# Patient Record
Sex: MALE | Race: WHITE | NOT HISPANIC OR LATINO | URBAN - METROPOLITAN AREA
[De-identification: names, ages, dates, MRNs, and addresses within clinical notes are randomized per-mention and may not be internally consistent; named-entity substitution may affect disease eponyms.]

---

## 2017-03-21 ENCOUNTER — OUTPATIENT (OUTPATIENT)
Dept: OUTPATIENT SERVICES | Facility: HOSPITAL | Age: 52
LOS: 1 days | Discharge: HOME | End: 2017-03-21

## 2017-06-27 DIAGNOSIS — Z00.00 ENCOUNTER FOR GENERAL ADULT MEDICAL EXAMINATION WITHOUT ABNORMAL FINDINGS: ICD-10-CM

## 2017-06-27 DIAGNOSIS — I48.0 PAROXYSMAL ATRIAL FIBRILLATION: ICD-10-CM

## 2017-06-27 DIAGNOSIS — I10 ESSENTIAL (PRIMARY) HYPERTENSION: ICD-10-CM

## 2017-06-27 DIAGNOSIS — E11.9 TYPE 2 DIABETES MELLITUS WITHOUT COMPLICATIONS: ICD-10-CM

## 2017-09-01 ENCOUNTER — OUTPATIENT (OUTPATIENT)
Dept: OUTPATIENT SERVICES | Facility: HOSPITAL | Age: 52
LOS: 1 days | Discharge: HOME | End: 2017-09-01

## 2017-09-01 DIAGNOSIS — Z01.818 ENCOUNTER FOR OTHER PREPROCEDURAL EXAMINATION: ICD-10-CM

## 2017-09-01 DIAGNOSIS — I48.91 UNSPECIFIED ATRIAL FIBRILLATION: ICD-10-CM

## 2017-09-01 DIAGNOSIS — R07.9 CHEST PAIN, UNSPECIFIED: ICD-10-CM

## 2017-09-16 ENCOUNTER — OUTPATIENT (OUTPATIENT)
Dept: OUTPATIENT SERVICES | Facility: HOSPITAL | Age: 52
LOS: 1 days | Discharge: HOME | End: 2017-09-16

## 2017-09-16 DIAGNOSIS — I48.91 UNSPECIFIED ATRIAL FIBRILLATION: ICD-10-CM

## 2017-09-16 DIAGNOSIS — R07.9 CHEST PAIN, UNSPECIFIED: ICD-10-CM

## 2017-10-20 ENCOUNTER — OUTPATIENT (OUTPATIENT)
Dept: OUTPATIENT SERVICES | Facility: HOSPITAL | Age: 52
LOS: 1 days | Discharge: HOME | End: 2017-10-20

## 2017-10-20 DIAGNOSIS — I48.91 UNSPECIFIED ATRIAL FIBRILLATION: ICD-10-CM

## 2017-10-20 DIAGNOSIS — M25.559 PAIN IN UNSPECIFIED HIP: ICD-10-CM

## 2017-10-20 DIAGNOSIS — E66.9 OBESITY, UNSPECIFIED: ICD-10-CM

## 2017-10-20 DIAGNOSIS — R07.9 CHEST PAIN, UNSPECIFIED: ICD-10-CM

## 2017-10-20 DIAGNOSIS — I10 ESSENTIAL (PRIMARY) HYPERTENSION: ICD-10-CM

## 2017-10-20 DIAGNOSIS — I48.0 PAROXYSMAL ATRIAL FIBRILLATION: ICD-10-CM

## 2017-10-20 DIAGNOSIS — E11.9 TYPE 2 DIABETES MELLITUS WITHOUT COMPLICATIONS: ICD-10-CM

## 2017-11-13 ENCOUNTER — OUTPATIENT (OUTPATIENT)
Facility: HOSPITAL | Age: 52
LOS: 1 days | Discharge: HOME | End: 2017-11-13

## 2017-11-14 DIAGNOSIS — I48.91 UNSPECIFIED ATRIAL FIBRILLATION: ICD-10-CM

## 2018-01-30 DIAGNOSIS — I48.91 UNSPECIFIED ATRIAL FIBRILLATION: ICD-10-CM

## 2018-01-30 DIAGNOSIS — R07.9 CHEST PAIN, UNSPECIFIED: ICD-10-CM

## 2018-11-19 ENCOUNTER — OUTPATIENT (OUTPATIENT)
Dept: OUTPATIENT SERVICES | Facility: HOSPITAL | Age: 53
LOS: 1 days | Discharge: HOME | End: 2018-11-19

## 2018-11-20 DIAGNOSIS — E11.9 TYPE 2 DIABETES MELLITUS WITHOUT COMPLICATIONS: ICD-10-CM

## 2018-11-23 ENCOUNTER — OUTPATIENT (OUTPATIENT)
Dept: OUTPATIENT SERVICES | Facility: HOSPITAL | Age: 53
LOS: 1 days | Discharge: HOME | End: 2018-11-23

## 2018-11-23 DIAGNOSIS — M25.559 PAIN IN UNSPECIFIED HIP: ICD-10-CM

## 2019-04-29 ENCOUNTER — OUTPATIENT (OUTPATIENT)
Dept: OUTPATIENT SERVICES | Facility: HOSPITAL | Age: 54
LOS: 1 days | Discharge: HOME | End: 2019-04-29

## 2019-04-29 DIAGNOSIS — I10 ESSENTIAL (PRIMARY) HYPERTENSION: ICD-10-CM

## 2019-04-29 DIAGNOSIS — I48.0 PAROXYSMAL ATRIAL FIBRILLATION: ICD-10-CM

## 2020-01-06 ENCOUNTER — EMERGENCY (EMERGENCY)
Facility: HOSPITAL | Age: 55
LOS: 0 days | Discharge: HOME | End: 2020-01-06
Attending: EMERGENCY MEDICINE | Admitting: EMERGENCY MEDICINE
Payer: COMMERCIAL

## 2020-01-06 VITALS
TEMPERATURE: 97 F | DIASTOLIC BLOOD PRESSURE: 65 MMHG | RESPIRATION RATE: 18 BRPM | OXYGEN SATURATION: 98 % | SYSTOLIC BLOOD PRESSURE: 130 MMHG | HEART RATE: 65 BPM

## 2020-01-06 VITALS — WEIGHT: 259.93 LBS

## 2020-01-06 DIAGNOSIS — I10 ESSENTIAL (PRIMARY) HYPERTENSION: ICD-10-CM

## 2020-01-06 DIAGNOSIS — Z79.899 OTHER LONG TERM (CURRENT) DRUG THERAPY: ICD-10-CM

## 2020-01-06 DIAGNOSIS — M19.90 UNSPECIFIED OSTEOARTHRITIS, UNSPECIFIED SITE: ICD-10-CM

## 2020-01-06 DIAGNOSIS — G89.29 OTHER CHRONIC PAIN: ICD-10-CM

## 2020-01-06 DIAGNOSIS — M25.559 PAIN IN UNSPECIFIED HIP: ICD-10-CM

## 2020-01-06 DIAGNOSIS — E11.9 TYPE 2 DIABETES MELLITUS WITHOUT COMPLICATIONS: ICD-10-CM

## 2020-01-06 DIAGNOSIS — M25.552 PAIN IN LEFT HIP: ICD-10-CM

## 2020-01-06 DIAGNOSIS — Z87.891 PERSONAL HISTORY OF NICOTINE DEPENDENCE: ICD-10-CM

## 2020-01-06 PROCEDURE — 73502 X-RAY EXAM HIP UNI 2-3 VIEWS: CPT | Mod: 26,LT

## 2020-01-06 PROCEDURE — 99283 EMERGENCY DEPT VISIT LOW MDM: CPT

## 2020-01-06 RX ORDER — OXYCODONE AND ACETAMINOPHEN 5; 325 MG/1; MG/1
2 TABLET ORAL ONCE
Refills: 0 | Status: DISCONTINUED | OUTPATIENT
Start: 2020-01-06 | End: 2020-01-06

## 2020-01-06 RX ORDER — METHOCARBAMOL 500 MG/1
2 TABLET, FILM COATED ORAL
Qty: 40 | Refills: 0
Start: 2020-01-06 | End: 2020-01-10

## 2020-01-06 RX ADMIN — OXYCODONE AND ACETAMINOPHEN 2 TABLET(S): 5; 325 TABLET ORAL at 16:21

## 2020-01-06 NOTE — ED PROVIDER NOTE - NS ED ROS FT
Constitutional: No fevers.   MS:  +left hip pain.   Neuro:  No numbness/tingling of extremities.   Skin:  No skin rash.   Endocrine: No hx of DM.

## 2020-01-06 NOTE — ED ADULT NURSE NOTE - NSIMPLEMENTINTERV_GEN_ALL_ED
Implemented All Fall Risk Interventions:  [place] to call system. Call bell, personal items and telephone within reach. Instruct patient to call for assistance. Room bathroom lighting operational. Non-slip footwear when patient is off stretcher. Physically safe environment: no spills, clutter or unnecessary equipment. Stretcher in lowest position, wheels locked, appropriate side rails in place. Provide visual cue, wrist band, yellow gown, etc. Monitor gait and stability. Monitor for mental status changes and reorient to person, place, and time. Review medications for side effects contributing to fall risk. Reinforce activity limits and safety measures with patient and family.

## 2020-01-06 NOTE — ED PROVIDER NOTE - ATTENDING CONTRIBUTION TO CARE
53 yo m with pmh of severe bilateral hip arthritis.  pt is supposed to get bilateral hip replacements done by ortho.  The left hip is more urgent to be replaced as per pt, but surgery was delayed due to surgeon requesting pt to lose weight.  pt reports 5 days of worsening left hip pain after pt was in the bathroom and felt left hip giving out.  no fall.  no head injury.  no numbness.  no injury.  pt walks with cane at baseline.  pt has been able to ambulate and bear weight.    awake, alert.  no midline vertebral tenderness.  LLE:  motor/sensation intact, flexion intact in hip.  no bony tenderness.    xray unremarkable for anything acute.  p: pain control, dc with outpatient ortho follow up

## 2020-01-06 NOTE — ED PROVIDER NOTE - OBJECTIVE STATEMENT
Patient is a 55 yo M w/ hx of a-fib on xarelto, DM, HTN, arthritis p/w hip pain. Patient in process of getting bilateral hip replacement with Dr. Jamaal Almaraz; follow up appointment on Jan 14. Patient's left hip gave out on him  (never fell) on Jan 2nd; since then has been ambulating but has increased pain to the left hip radiating down the left leg. Patient denies numbness/tingling of the lower extremities. Has been taking robaxin and alleve without relief. Patient considered he may have a fracture. Denies urinary/bowel incontinence. Denies fevers, denies drug use.

## 2020-01-06 NOTE — ED PROVIDER NOTE - PHYSICAL EXAMINATION
CONSTITUTIONAL: Well-developed; obese; in no acute distress.   SKIN: warm, dry  HEAD: Normocephalic; atraumatic.  EXT: Ambulating with moderate discomfort; limited range of motion of bilateral hips. Tenderness to palpation in the left gluteal region. Negative straight leg raise.   NEURO: Alert, oriented, sensation to light touch intact to both lower extremities.   VASCULAR: DP pulses 2+ bilaterally.   PSYCH: Cooperative, appropriate.

## 2020-01-06 NOTE — ED PROVIDER NOTE - CARE PROVIDER_API CALL
Rinku Boothe)  Orthopaedic Surgery  [address] [place] [id number]  Phone: [phone]  Fax: [phone]  Follow Up Time: 1-3 Days

## 2020-01-06 NOTE — ED PROVIDER NOTE - CLINICAL SUMMARY MEDICAL DECISION MAKING FREE TEXT BOX
Pt with history of chronic bilateral hip arthritis and he is followed by ortho and requires bilateral hip replacement.  pt here with worsneing left hip pain.  no numbness, no trauma, no fall.  no direct injury.  pt able to ambulate and is able to bear weight.    xray no acute fracture.  pt already has follow up scheduled with his orthopedic.  pt aware of importance of ortho follow up for further management.

## 2020-01-06 NOTE — ED PROVIDER NOTE - NSFOLLOWUPINSTRUCTIONS_ED_ALL_ED_FT
Arthralgia    WHAT YOU NEED TO KNOW:    Arthralgia is pain in one or more joints, with no inflammation. It may be short-term and get better within 6 to 8 weeks. Arthralgia can be an early sign of arthritis. Arthralgia may be caused by a medical condition, such as a hormone disorder or a tumor. It may also be caused by an infection or injury.     DISCHARGE INSTRUCTIONS:    Medicines: The following medicines may be ordered for you:     Acetaminophen decreases pain. Ask how much to take and how often to take it. Follow directions. Acetaminophen can cause liver damage if not taken correctly.      NSAIDs decrease pain and prevent swelling. Ask your healthcare provider which medicine is right for you. Ask how much to take and when to take it. Take as directed. NSAIDs can cause stomach bleeding and kidney problems if not taken correctly.       Pain relief cream decreases pain. Use this cream as directed.      Take your medicine as directed. Contact your healthcare provider if you think your medicine is not helping or if you have side effects. Tell him of her if you are allergic to any medicine. Keep a list of the medicines, vitamins, and herbs you take. Include the amounts, and when and why you take them. Bring the list or the pill bottles to follow-up visits. Carry your medicine list with you in case of an emergency.    Follow up with your healthcare provider or specialist as directed: Write down your questions so you remember to ask them during your visits.     Self-care:     Apply heat to help decrease pain. Use a heating pad or heat wrap. Apply heat for 20 to 30 minutes every 2 hours for as many days as directed.       Rest as much as possible. Avoid activities that cause joint pain.       Apply ice to help decrease swelling and pain. Ice may also help prevent tissue damage. Use an ice pack, or put crushed ice in a plastic bag. Cover it with a towel and place it on your painful joint for 15 to 20 minutes every hour or as directed.       Support the joint with a brace or elastic wrap as directed.       Elevate your joint above the level of your heart as often as you can to help decrease swelling and pain. Prop your painful joint on pillows or blankets to keep it elevated comfortably.       Lose weight if you are overweight. Extra weight can put pressure on your joints and cause more pain. Ask your healthcare provider how much you should weigh. Ask him to help you create a weight loss plan.       Exercise regularly to help improve joint movement and to decrease pain. Ask about the best exercise plan for you. Low-impact exercises can help take the pressure off your joints. Examples are walking, swimming, and water aerobics.     Physical therapy: A physical therapist teaches you exercises to help improve movement and strength, and to decrease pain. Ask your healthcare provider if physical therapy is right for you.    Contact your healthcare provider or specialist if:     You have a fever.       You continue to have joint pain that cannot be relieved with heat, ice, or medicine.      You have pain and inflammation around your joint.      You have questions or concerns about your condition or care.    Return to the emergency department if:     You have sudden, severe pain when you move your joint.       You have a fever and shaking chills.      You cannot move your joint.      You lose feeling on the side of your body where you have the painful joint.          © Copyright [employer] 2019 All illustrations and images included in CareNotes are the copyrighted property of A.D.A.M., Inc. or [employer].

## 2020-09-29 PROBLEM — I10 ESSENTIAL (PRIMARY) HYPERTENSION: Chronic | Status: ACTIVE | Noted: 2020-01-06

## 2020-09-29 PROBLEM — I48.91 UNSPECIFIED ATRIAL FIBRILLATION: Chronic | Status: ACTIVE | Noted: 2020-01-06

## 2020-09-29 PROBLEM — E11.9 TYPE 2 DIABETES MELLITUS WITHOUT COMPLICATIONS: Chronic | Status: ACTIVE | Noted: 2020-01-06

## 2020-10-06 ENCOUNTER — OUTPATIENT (OUTPATIENT)
Dept: OUTPATIENT SERVICES | Facility: HOSPITAL | Age: 55
LOS: 1 days | Discharge: HOME | End: 2020-10-06
Payer: COMMERCIAL

## 2020-10-06 VITALS
OXYGEN SATURATION: 98 % | SYSTOLIC BLOOD PRESSURE: 143 MMHG | RESPIRATION RATE: 15 BRPM | DIASTOLIC BLOOD PRESSURE: 78 MMHG | WEIGHT: 250.22 LBS | TEMPERATURE: 98 F | HEART RATE: 78 BPM | HEIGHT: 69 IN

## 2020-10-06 DIAGNOSIS — Z98.890 OTHER SPECIFIED POSTPROCEDURAL STATES: Chronic | ICD-10-CM

## 2020-10-06 DIAGNOSIS — M16.12 UNILATERAL PRIMARY OSTEOARTHRITIS, LEFT HIP: ICD-10-CM

## 2020-10-06 DIAGNOSIS — Z01.818 ENCOUNTER FOR OTHER PREPROCEDURAL EXAMINATION: ICD-10-CM

## 2020-10-06 LAB
A1C WITH ESTIMATED AVERAGE GLUCOSE RESULT: 7.2 % — HIGH (ref 4–5.6)
ALBUMIN SERPL ELPH-MCNC: 4.8 G/DL — SIGNIFICANT CHANGE UP (ref 3.5–5.2)
ALP SERPL-CCNC: 52 U/L — SIGNIFICANT CHANGE UP (ref 30–115)
ALT FLD-CCNC: 17 U/L — SIGNIFICANT CHANGE UP (ref 0–41)
ANION GAP SERPL CALC-SCNC: 10 MMOL/L — SIGNIFICANT CHANGE UP (ref 7–14)
APTT BLD: 31.2 SEC — SIGNIFICANT CHANGE UP (ref 27–39.2)
AST SERPL-CCNC: 19 U/L — SIGNIFICANT CHANGE UP (ref 0–41)
BASOPHILS # BLD AUTO: 0.04 K/UL — SIGNIFICANT CHANGE UP (ref 0–0.2)
BASOPHILS NFR BLD AUTO: 0.6 % — SIGNIFICANT CHANGE UP (ref 0–1)
BILIRUB SERPL-MCNC: 0.5 MG/DL — SIGNIFICANT CHANGE UP (ref 0.2–1.2)
BLD GP AB SCN SERPL QL: SIGNIFICANT CHANGE UP
BUN SERPL-MCNC: 18 MG/DL — SIGNIFICANT CHANGE UP (ref 10–20)
CALCIUM SERPL-MCNC: 9.9 MG/DL — SIGNIFICANT CHANGE UP (ref 8.5–10.1)
CHLORIDE SERPL-SCNC: 106 MMOL/L — SIGNIFICANT CHANGE UP (ref 98–110)
CO2 SERPL-SCNC: 23 MMOL/L — SIGNIFICANT CHANGE UP (ref 17–32)
CREAT SERPL-MCNC: 0.7 MG/DL — SIGNIFICANT CHANGE UP (ref 0.7–1.5)
EOSINOPHIL # BLD AUTO: 0.25 K/UL — SIGNIFICANT CHANGE UP (ref 0–0.7)
EOSINOPHIL NFR BLD AUTO: 3.9 % — SIGNIFICANT CHANGE UP (ref 0–8)
ESTIMATED AVERAGE GLUCOSE: 160 MG/DL — HIGH (ref 68–114)
GLUCOSE SERPL-MCNC: 153 MG/DL — HIGH (ref 70–99)
HCT VFR BLD CALC: 44.8 % — SIGNIFICANT CHANGE UP (ref 42–52)
HGB BLD-MCNC: 15.4 G/DL — SIGNIFICANT CHANGE UP (ref 14–18)
IMM GRANULOCYTES NFR BLD AUTO: 0.3 % — SIGNIFICANT CHANGE UP (ref 0.1–0.3)
INR BLD: 1.17 RATIO — SIGNIFICANT CHANGE UP (ref 0.65–1.3)
LYMPHOCYTES # BLD AUTO: 2.07 K/UL — SIGNIFICANT CHANGE UP (ref 1.2–3.4)
LYMPHOCYTES # BLD AUTO: 32.5 % — SIGNIFICANT CHANGE UP (ref 20.5–51.1)
MCHC RBC-ENTMCNC: 32.7 PG — HIGH (ref 27–31)
MCHC RBC-ENTMCNC: 34.4 G/DL — SIGNIFICANT CHANGE UP (ref 32–37)
MCV RBC AUTO: 95.1 FL — HIGH (ref 80–94)
MONOCYTES # BLD AUTO: 0.7 K/UL — HIGH (ref 0.1–0.6)
MONOCYTES NFR BLD AUTO: 11 % — HIGH (ref 1.7–9.3)
MRSA PCR RESULT.: NEGATIVE — SIGNIFICANT CHANGE UP
NEUTROPHILS # BLD AUTO: 3.29 K/UL — SIGNIFICANT CHANGE UP (ref 1.4–6.5)
NEUTROPHILS NFR BLD AUTO: 51.7 % — SIGNIFICANT CHANGE UP (ref 42.2–75.2)
NRBC # BLD: 0 /100 WBCS — SIGNIFICANT CHANGE UP (ref 0–0)
PLATELET # BLD AUTO: 208 K/UL — SIGNIFICANT CHANGE UP (ref 130–400)
POTASSIUM SERPL-MCNC: 4.6 MMOL/L — SIGNIFICANT CHANGE UP (ref 3.5–5)
POTASSIUM SERPL-SCNC: 4.6 MMOL/L — SIGNIFICANT CHANGE UP (ref 3.5–5)
PROT SERPL-MCNC: 7.4 G/DL — SIGNIFICANT CHANGE UP (ref 6–8)
PROTHROM AB SERPL-ACNC: 13.4 SEC — HIGH (ref 9.95–12.87)
RBC # BLD: 4.71 M/UL — SIGNIFICANT CHANGE UP (ref 4.7–6.1)
RBC # FLD: 12.1 % — SIGNIFICANT CHANGE UP (ref 11.5–14.5)
SODIUM SERPL-SCNC: 139 MMOL/L — SIGNIFICANT CHANGE UP (ref 135–146)
WBC # BLD: 6.37 K/UL — SIGNIFICANT CHANGE UP (ref 4.8–10.8)
WBC # FLD AUTO: 6.37 K/UL — SIGNIFICANT CHANGE UP (ref 4.8–10.8)

## 2020-10-06 PROCEDURE — 73502 X-RAY EXAM HIP UNI 2-3 VIEWS: CPT | Mod: 26,LT

## 2020-10-06 PROCEDURE — 93010 ELECTROCARDIOGRAM REPORT: CPT

## 2020-10-06 PROCEDURE — 71046 X-RAY EXAM CHEST 2 VIEWS: CPT | Mod: 26

## 2020-10-06 NOTE — H&P PST ADULT - RS GEN PE MLT RESP DETAILS PC
respirations non-labored/airway patent/breath sounds equal/no chest wall tenderness/normal/no intercostal retractions/good air movement/clear to auscultation bilaterally

## 2020-10-06 NOTE — H&P PST ADULT - REASON FOR ADMISSION
PT PRESENTS TO PAST WITH NO SOB, CP, PALPITATIONS, DYSURIA, UTI OR URI AT PRESENT.   PT ABLE TO WALK UP 1 -2  FLIGHTS OF STEPS WITH NO SOB-VERY SLOW- AK AMBULATES WITH A CANE.   AS PER THE PT, THIS IS HIS/HER COMPLETE MEDICAL AND SURGICAL HX, INCLUDING MEDICATIONS PRESCRIBED AND OVER THE COUNTER  pt denies any covid s/s, or tested positive in the past  pt advised self quarantine till day of procedure  denies travel outside the [place] in the past 30 days  Anesthesia Alert  NO--Difficult Airway  NO--History of neck surgery or radiation  NO--Limited ROM of neck  NO--History of Malignant hyperthermia  NO--Personal or family history of Pseudocholinesterase deficiency  NO--Prior Anesthesia Complication  NO--Latex Allergy  NO--Loose teeth  NO--History of Rheumatoid Arthritis  NO--ASHLI  NO--Other_____  PT SCHEDULED FOR LEFT TOTAL HIP REPLACEMENT.

## 2020-10-18 ENCOUNTER — OUTPATIENT (OUTPATIENT)
Dept: OUTPATIENT SERVICES | Facility: HOSPITAL | Age: 55
LOS: 1 days | Discharge: HOME | End: 2020-10-18

## 2020-10-18 DIAGNOSIS — Z98.890 OTHER SPECIFIED POSTPROCEDURAL STATES: Chronic | ICD-10-CM

## 2020-10-19 DIAGNOSIS — Z11.59 ENCOUNTER FOR SCREENING FOR OTHER VIRAL DISEASES: ICD-10-CM

## 2020-10-20 NOTE — ASU PATIENT PROFILE, ADULT - DOES PATIENT HAVE ADVANCE DIRECTIVE
Verified Results  CBC WITH AUTOMATED DIFFERENTIAL 09Jun2017 08:44AM CAMERON XAVIER   [Jun 13, 2017 10:04AM CAMERON XAVIER]  labs look good.   Will await stress test and echo as discussed.     Test Name Result Flag Reference   WHITE BLOOD COUNT 7.8 K/mcL  4.2-11.0   RED CELL COUNT 4.55 mil/mcL  4.00-5.20   HEMOGLOBIN 13.5 g/dl  12.0-15.5   HEMATOCRIT 41.7 %  36.0-46.5   MEAN CORPUSCULAR VOLUME 91.6 fL  78.0-100.0   MEAN CORPUSCULAR HEMOGLOBIN 29.7 pg  26.0-34.0   MEAN CORPUSCULAR HGB CONC 32.4 g/dl  32.0-36.5   RDW-CV 12.9 %  11.0-15.0   PLATELET COUNT 216 K/mcL  140-450   DIFF TYPE      AUTOMATED DIFFERENTIAL   QUOC% 59 %     LYM% 28 %     MON% 11 %     EOS% 2 %     BASO% 0 %     QUOC ABS 4.6 K/mcL  1.8-7.7   LYM ABS 2.2 K/mcL  1.0-4.8   MON ABS 0.8 K/mcL  0.3-0.9   EOS ABS 0.2 K/mcL  0.1-0.5   BASO ABS 0.0 K/mcL  0.0-0.3     COMP METABOLIC PNL [id number] 08:44AM CAMERON XAVIER   [Jun 13, 2017 10:04AM CAMERON XAVIER]  labs look good.   Will await stress test and echo as discussed.     Test Name Result Flag Reference   FASTING STATUS 12 hrs     SODIUM 143 mmol/L  135-145   POTASSIUM 4.6 mmol/L  3.4-5.1   CHLORIDE 109 mmol/L H    CARBON DIOXIDE 26 mmol/L  21-32   ANION GAP 13 mmol/L  10-20   GLUCOSE 95 mg/dl  65-99   BUN 10 mg/dl  6-20   CREATININE 0.75 mg/dl  0.51-0.95   GFR EST.AFRICAN AMER >90     eGFR results = or >90 mL/min/1.73m2 = Normal kidney function.   GFR EST.NONAFRI AMER >90     eGFR results = or >90 mL/min/1.73m2 = Normal kidney function.   BUN/CREATININE RATIO 13  7-25   CALCIUM 8.9 mg/dl  8.4-10.2   BILIRUBIN TOTAL 0.5 mg/dl  0.2-1.0   GOT/AST 28 Units/L  <38   GPT/ALT 53 Units/L  <79   ALKALINE PHOSPHATASE 95 Units/L     TOTAL PROTEIN 6.8 g/dl  6.4-8.2   ALBUMIN 3.8 g/dl  3.6-5.1   GLOBULIN (CALCULATED) 3.0 g/dl  2.0-4.0   A/G RATIO 1.3  1.0-2.4     TSH WITH REFLEX [id number] 08:44AM CAMERON XAVIER   [Jun 13, 2017 10:04AM CAMERON XAVIER]  labs look good.   Will await stress test  and echo as discussed.     Test Name Result Flag Reference   TSH 4.398 mcUnits/mL  0.350-5.000   Findings most consistent with euthyroid state, no additional testing suggested. TSH may be normal in patients with thyroid dysfunction and pituitary disease. Clinical correlation recommended.  (Reflex TSH algorithm is not recommended in hospitalized patients. A variety of drugs, as well as serious acute and chronic illnesses may alter thyroid function tests. Commonly implicated drugs include glucocorticoids, dopamine, carbamazepine, iodine, amiodarone, lithium and heparin.)        No

## 2020-10-21 ENCOUNTER — INPATIENT (INPATIENT)
Facility: HOSPITAL | Age: 55
LOS: 0 days | Discharge: HOME HEALTH | End: 2020-10-22
Attending: ORTHOPAEDIC SURGERY | Admitting: ORTHOPAEDIC SURGERY
Payer: COMMERCIAL

## 2020-10-21 VITALS
WEIGHT: 255.07 LBS | TEMPERATURE: 97 F | RESPIRATION RATE: 17 BRPM | DIASTOLIC BLOOD PRESSURE: 57 MMHG | HEIGHT: 69 IN | HEART RATE: 66 BPM | OXYGEN SATURATION: 100 % | SYSTOLIC BLOOD PRESSURE: 121 MMHG

## 2020-10-21 DIAGNOSIS — Z98.890 OTHER SPECIFIED POSTPROCEDURAL STATES: Chronic | ICD-10-CM

## 2020-10-21 DIAGNOSIS — M16.9 OSTEOARTHRITIS OF HIP, UNSPECIFIED: ICD-10-CM

## 2020-10-21 PROCEDURE — 88305 TISSUE EXAM BY PATHOLOGIST: CPT | Mod: 26

## 2020-10-21 PROCEDURE — 99233 SBSQ HOSP IP/OBS HIGH 50: CPT

## 2020-10-21 PROCEDURE — 88311 DECALCIFY TISSUE: CPT | Mod: 26

## 2020-10-21 RX ORDER — SODIUM CHLORIDE 9 MG/ML
1000 INJECTION, SOLUTION INTRAVENOUS
Refills: 0 | Status: DISCONTINUED | OUTPATIENT
Start: 2020-10-21 | End: 2020-10-21

## 2020-10-21 RX ORDER — DEXTROSE 50 % IN WATER 50 %
12.5 SYRINGE (ML) INTRAVENOUS ONCE
Refills: 0 | Status: DISCONTINUED | OUTPATIENT
Start: 2020-10-21 | End: 2020-10-22

## 2020-10-21 RX ORDER — GLUCAGON INJECTION, SOLUTION 0.5 MG/.1ML
1 INJECTION, SOLUTION SUBCUTANEOUS ONCE
Refills: 0 | Status: DISCONTINUED | OUTPATIENT
Start: 2020-10-21 | End: 2020-10-22

## 2020-10-21 RX ORDER — DEXTROSE 50 % IN WATER 50 %
25 SYRINGE (ML) INTRAVENOUS ONCE
Refills: 0 | Status: DISCONTINUED | OUTPATIENT
Start: 2020-10-21 | End: 2020-10-22

## 2020-10-21 RX ORDER — RIVAROXABAN 15 MG-20MG
10 KIT ORAL
Refills: 0 | Status: DISCONTINUED | OUTPATIENT
Start: 2020-10-22 | End: 2020-10-22

## 2020-10-21 RX ORDER — KETOROLAC TROMETHAMINE 30 MG/ML
15 SYRINGE (ML) INJECTION EVERY 6 HOURS
Refills: 0 | Status: DISCONTINUED | OUTPATIENT
Start: 2020-10-21 | End: 2020-10-22

## 2020-10-21 RX ORDER — ONDANSETRON 8 MG/1
4 TABLET, FILM COATED ORAL EVERY 6 HOURS
Refills: 0 | Status: DISCONTINUED | OUTPATIENT
Start: 2020-10-21 | End: 2020-10-22

## 2020-10-21 RX ORDER — INSULIN LISPRO 100/ML
VIAL (ML) SUBCUTANEOUS
Refills: 0 | Status: DISCONTINUED | OUTPATIENT
Start: 2020-10-21 | End: 2020-10-22

## 2020-10-21 RX ORDER — ONDANSETRON 8 MG/1
4 TABLET, FILM COATED ORAL ONCE
Refills: 0 | Status: DISCONTINUED | OUTPATIENT
Start: 2020-10-21 | End: 2020-10-21

## 2020-10-21 RX ORDER — DEXTROSE 50 % IN WATER 50 %
15 SYRINGE (ML) INTRAVENOUS ONCE
Refills: 0 | Status: DISCONTINUED | OUTPATIENT
Start: 2020-10-21 | End: 2020-10-22

## 2020-10-21 RX ORDER — CEFAZOLIN SODIUM 1 G
2000 VIAL (EA) INJECTION EVERY 8 HOURS
Refills: 0 | Status: COMPLETED | OUTPATIENT
Start: 2020-10-21 | End: 2020-10-21

## 2020-10-21 RX ORDER — SODIUM CHLORIDE 9 MG/ML
1000 INJECTION INTRAMUSCULAR; INTRAVENOUS; SUBCUTANEOUS
Refills: 0 | Status: DISCONTINUED | OUTPATIENT
Start: 2020-10-21 | End: 2020-10-22

## 2020-10-21 RX ORDER — SODIUM CHLORIDE 9 MG/ML
1000 INJECTION, SOLUTION INTRAVENOUS
Refills: 0 | Status: DISCONTINUED | OUTPATIENT
Start: 2020-10-21 | End: 2020-10-22

## 2020-10-21 RX ORDER — ACETAMINOPHEN 500 MG
1000 TABLET ORAL ONCE
Refills: 0 | Status: COMPLETED | OUTPATIENT
Start: 2020-10-21 | End: 2020-10-21

## 2020-10-21 RX ORDER — PANTOPRAZOLE SODIUM 20 MG/1
40 TABLET, DELAYED RELEASE ORAL
Refills: 0 | Status: DISCONTINUED | OUTPATIENT
Start: 2020-10-21 | End: 2020-10-22

## 2020-10-21 RX ORDER — DEXAMETHASONE 0.5 MG/5ML
4 ELIXIR ORAL ONCE
Refills: 0 | Status: COMPLETED | OUTPATIENT
Start: 2020-10-22 | End: 2020-10-22

## 2020-10-21 RX ORDER — MEPERIDINE HYDROCHLORIDE 50 MG/ML
12.5 INJECTION INTRAMUSCULAR; INTRAVENOUS; SUBCUTANEOUS
Refills: 0 | Status: DISCONTINUED | OUTPATIENT
Start: 2020-10-21 | End: 2020-10-21

## 2020-10-21 RX ORDER — CELECOXIB 200 MG/1
400 CAPSULE ORAL ONCE
Refills: 0 | Status: COMPLETED | OUTPATIENT
Start: 2020-10-21 | End: 2020-10-21

## 2020-10-21 RX ORDER — HYDROMORPHONE HYDROCHLORIDE 2 MG/ML
0.5 INJECTION INTRAMUSCULAR; INTRAVENOUS; SUBCUTANEOUS
Refills: 0 | Status: DISCONTINUED | OUTPATIENT
Start: 2020-10-21 | End: 2020-10-21

## 2020-10-21 RX ORDER — ACETAMINOPHEN 500 MG
650 TABLET ORAL EVERY 6 HOURS
Refills: 0 | Status: DISCONTINUED | OUTPATIENT
Start: 2020-10-21 | End: 2020-10-22

## 2020-10-21 RX ORDER — HYDROMORPHONE HYDROCHLORIDE 2 MG/ML
1 INJECTION INTRAMUSCULAR; INTRAVENOUS; SUBCUTANEOUS
Refills: 0 | Status: DISCONTINUED | OUTPATIENT
Start: 2020-10-21 | End: 2020-10-21

## 2020-10-21 RX ORDER — POLYETHYLENE GLYCOL 3350 17 G/17G
17 POWDER, FOR SOLUTION ORAL DAILY
Refills: 0 | Status: DISCONTINUED | OUTPATIENT
Start: 2020-10-21 | End: 2020-10-22

## 2020-10-21 RX ORDER — CHLORHEXIDINE GLUCONATE 213 G/1000ML
1 SOLUTION TOPICAL
Refills: 0 | Status: DISCONTINUED | OUTPATIENT
Start: 2020-10-21 | End: 2020-10-22

## 2020-10-21 RX ORDER — LOSARTAN POTASSIUM 100 MG/1
25 TABLET, FILM COATED ORAL DAILY
Refills: 0 | Status: DISCONTINUED | OUTPATIENT
Start: 2020-10-21 | End: 2020-10-22

## 2020-10-21 RX ORDER — SENNA PLUS 8.6 MG/1
2 TABLET ORAL AT BEDTIME
Refills: 0 | Status: DISCONTINUED | OUTPATIENT
Start: 2020-10-21 | End: 2020-10-22

## 2020-10-21 RX ORDER — TRAMADOL HYDROCHLORIDE 50 MG/1
50 TABLET ORAL EVERY 4 HOURS
Refills: 0 | Status: DISCONTINUED | OUTPATIENT
Start: 2020-10-21 | End: 2020-10-22

## 2020-10-21 RX ORDER — TRAMADOL HYDROCHLORIDE 50 MG/1
50 TABLET ORAL EVERY 6 HOURS
Refills: 0 | Status: DISCONTINUED | OUTPATIENT
Start: 2020-10-21 | End: 2020-10-21

## 2020-10-21 RX ORDER — MAGNESIUM HYDROXIDE 400 MG/1
30 TABLET, CHEWABLE ORAL DAILY
Refills: 0 | Status: DISCONTINUED | OUTPATIENT
Start: 2020-10-21 | End: 2020-10-22

## 2020-10-21 RX ORDER — CELECOXIB 200 MG/1
200 CAPSULE ORAL EVERY 12 HOURS
Refills: 0 | Status: DISCONTINUED | OUTPATIENT
Start: 2020-10-22 | End: 2020-10-22

## 2020-10-21 RX ORDER — METFORMIN HYDROCHLORIDE 850 MG/1
500 TABLET ORAL
Refills: 0 | Status: DISCONTINUED | OUTPATIENT
Start: 2020-10-22 | End: 2020-10-22

## 2020-10-21 RX ADMIN — Medication 650 MILLIGRAM(S): at 17:43

## 2020-10-21 RX ADMIN — POLYETHYLENE GLYCOL 3350 17 GRAM(S): 17 POWDER, FOR SOLUTION ORAL at 11:59

## 2020-10-21 RX ADMIN — Medication 15 MILLIGRAM(S): at 12:00

## 2020-10-21 RX ADMIN — Medication 650 MILLIGRAM(S): at 12:18

## 2020-10-21 RX ADMIN — Medication 1000 MILLIGRAM(S): at 06:22

## 2020-10-21 RX ADMIN — Medication 1 TABLET(S): at 11:58

## 2020-10-21 RX ADMIN — Medication 100 MILLIGRAM(S): at 22:55

## 2020-10-21 RX ADMIN — Medication 15 MILLIGRAM(S): at 23:59

## 2020-10-21 RX ADMIN — Medication 15 MILLIGRAM(S): at 17:42

## 2020-10-21 RX ADMIN — CELECOXIB 400 MILLIGRAM(S): 200 CAPSULE ORAL at 06:22

## 2020-10-21 RX ADMIN — SODIUM CHLORIDE 100 MILLILITER(S): 9 INJECTION INTRAMUSCULAR; INTRAVENOUS; SUBCUTANEOUS at 22:56

## 2020-10-21 RX ADMIN — Medication 650 MILLIGRAM(S): at 11:57

## 2020-10-21 RX ADMIN — Medication 15 MILLIGRAM(S): at 12:18

## 2020-10-21 RX ADMIN — SODIUM CHLORIDE 120 MILLILITER(S): 9 INJECTION, SOLUTION INTRAVENOUS at 10:43

## 2020-10-21 RX ADMIN — Medication 100 MILLIGRAM(S): at 15:23

## 2020-10-21 RX ADMIN — Medication 650 MILLIGRAM(S): at 18:49

## 2020-10-21 RX ADMIN — Medication 15 MILLIGRAM(S): at 18:49

## 2020-10-21 NOTE — BRIEF OPERATIVE NOTE - COMMENTS
depuy pinnacle cup actis stem  pain cocktail given   wbat   no hip precautions   DA approach   Pt is on Xarelto for afib , will suffice  for deep vein thrombosis prophylaxis  as well

## 2020-10-21 NOTE — CHART NOTE - NSCHARTNOTEFT_GEN_A_CORE
PACU ANESTHESIA ADMISSION NOTE      Procedure: MARK - total hip arthroplasty      Post op diagnosis:  OA (osteoarthritis) of hip        ____  Intubated  TV:______       Rate: ______      FiO2: ______    ____  Patent Airway    ____  Full return of protective reflexes    ____  Full recovery from anesthesia / back to baseline status    Vitals:  temp(F) 98  /57  spo2 98  RR 18    Mental Status:  ____ x Awake   _____ Alert   _____ Drowsy   _____ Sedated    Nausea/Vomiting:  ____ NO  ___x ___Yes,   See Post - Op Orders          Pain Scale (0-10):  _____    Treatment: ____ None    ___x _ See Post - Op/PCA Orders    Post - Operative Fluids:   ____ Oral   ____x  See Post - Op Orders    Plan: Discharge:   ____Home       ___x __Floor     _____Critical Care    _____  Other:_________________    Comments: uneventful anesthesia course no complications. VItals stable. Pt transferred to PACU

## 2020-10-21 NOTE — PHYSICAL THERAPY INITIAL EVALUATION ADULT - CRITERIA FOR SKILLED THERAPEUTIC INTERVENTIONS
predicted duration of therapy intervention/anticipated equipment needs at discharge/anticipated discharge recommendation/functional limitations in following categories/therapy frequency/impairments found/rehab potential

## 2020-10-21 NOTE — CONSULT NOTE ADULT - ASSESSMENT
54 yo M presents for arthroplasty    Afib  - CHADsVasc 2, low risk for stroke  - pt takes Xarelto at home  - he is unsure of his dose at home. typical afib dose is 20 mg once daily  - pt currently getting 10 mg BID, discussed with ortho PA-clarify home dose with pt's pharmacy and resume  - c/w metoprolol    DM II  - takes metformin at home-states he only takes it once a day not twice  - has sliding scale coverage ordered  - recommend against sliding scale while in house  - FS goal 110-180  - pt states he takes losartan to protect his kidneys against metformin-assume pt has albuminuria from diabetes    **recommend against using ketorolac, celecoxib, and xarelto all togehter, increase risk of bleeding. Also recommend against celecoxib and ketorolac together as both are NSAIDs which can increase risk of renal dysfunction  ketorolac to end tomorrow and that is when celecoxib will begin*    DVTpx  Full Code  From home

## 2020-10-21 NOTE — PHYSICAL THERAPY INITIAL EVALUATION ADULT - GENERAL OBSERVATIONS, REHAB EVAL
13:50-14:20 Chart reviewed. Pt encountered semireclined in bed, may be seen by Physical Therapist as confirmed with Nurse. Patient denied pain at rest and ready to walk now; +Aquacel dressing (L) hip/ compression socks/heplock (B) hands

## 2020-10-21 NOTE — PHYSICAL THERAPY INITIAL EVALUATION ADULT - ADDITIONAL COMMENTS
per patient, has 2 small steps outside house with no rail, 13 steps with (L) handrail going up to bedroom; has 1/2 bathroom on first floor but toilet seat may be too low; only has [facility] at this time

## 2020-10-21 NOTE — PATIENT PROFILE ADULT - HAS THE PATIENT USED TOBACCO IN THE PAST 30 DAYS?
COVID-19 Telephone Triage  Complete the Travel Screening questions regarding Communicable disease and Travel History, then complete the pathway listed below:    Has the patient tested positive for Coronavirus within the past month or in the process of testing for Coronavirus now?    NO - Has the patient or anyone in the patient's household, or if the patient will require a person to accompany them in the exam room (patient is a minor, disabled, etc.) has that person tested positive for Coronavirus within the past month? No - Identify symptoms and obtain travel and exposure history: (Document in Screening tool)      Does the patient or any member of the patient's household, or the person who will accompany the patient to their appointment have a fever, sore throat, cough, shortness of breath, nausea, vomiting, diarrhea, shaking or chills or new onset of loss of taste or smell? no    In the past 14 days has the patient or the person who will accompany the patient to their appointment, traveled outside of [place]? (For patients who live in [place], do not consider [place] as out of state travel) no    In the past 14 days has the patient or the person who will accompany the patient to their appointment, had close contact with an individual outside of their household who has received a Coronavirus diagnosis or had contact with anyone who is in the process of testing? no  If all the questions in the screening were NO-   Clinic visit can proceed as scheduled or be scheduled per current scheduling protocols.     Clive was advised of increased risk of COVID-19 exposure due to contact with communal spaces and additional persons. Patient Accepts appointment.      If the patient needs to be seen in the ED for any reason:  Call the ED of patient’s choice to let them know of a patient with a positive COVID-19 screen is being sent to the ED for further evaluation.    Have the ED clarify the location the patient should go  once they arrive at the ED. [place] Take note of any instructions givento you by the ED.    Communicate instructions provided by the ED to the patient.  Inform the patient they must put on a mask once they arrive to the ED.   No

## 2020-10-21 NOTE — PROGRESS NOTE ADULT - SUBJECTIVE AND OBJECTIVE BOX
[facility]  POST OP CHECK     T(C): 36.1 (10-21-20 @ 13:02), Max: 36.6 (10-21-20 @ 10:45)  HR: 99 (10-21-20 @ 13:02) (66 - 126)  BP: 116/78 (10-21-20 @ 13:02) (107/77 - 121/76)  RR: 17 (10-21-20 @ 12:00) (10 - 20)  SpO2: 100% (10-21-20 @ 12:00) (99% - 100%)      S/P MARK ARTHROPLASTY  PAIN CONTROLLED  VSS   A&O X3  DRESSING Aquacell is in place   NVI  ANTIBIOTICS INFUSED  DVT PROPHYLAXIS ORDERED xarelto   ENCOURAGE INCENTIVE SPIROMETRY  AM LABS  REHAB TO BEGIN pod 0   D/C PLANNING

## 2020-10-21 NOTE — CONSULT NOTE ADULT - SUBJECTIVE AND OBJECTIVE BOX
56 yo M presents for total hip arthroplasty      ROS: Reports feeling groggy. All other systems are negative.    Vital Signs:    T(F): 97 (10-21-20 @ 13:02), Max: 97.8 (10-21-20 @ 10:45)  HR: 99 (10-21-20 @ 13:02) (66 - 126)  BP: 116/78 (10-21-20 @ 13:02) (107/77 - 121/76)  RR: 17 (10-21-20 @ 12:00) (10 - 20)  SpO2: 100% (10-21-20 @ 12:00) (99% - 100%)  I&O's Summary    21 Oct 2020 07:01  -  21 Oct 2020 15:43  --------------------------------------------------------  IN: 480 mL / OUT: 500 mL / NET: -20 mL      Daily Height in cm: 175.26 (21 Oct 2020 13:02)    Daily Weight in k (21 Oct 2020 13:02)  CAPILLARY BLOOD GLUCOSE      POCT Blood Glucose.: 149 mg/dL (21 Oct 2020 11:23)  POCT Blood Glucose.: 154 mg/dL (21 Oct 2020 10:07)  POCT Blood Glucose.: 146 mg/dL (21 Oct 2020 06:19)      PHYSICAL EXAM:  GENERAL:  NAD  SKIN: No rashes or lesions  HEENT: Atraumatic. Normocephalic. Anicteric  NECK:  No JVD.   PULMONARY: Clear to ausculation bilaterally. No wheezing. No rales  CVS: Normal S1, S2. Regular rate and rhythm. No murmurs.  ABDOMEN/GI: Soft, Nontender, Nondistended; Bowel sounds are present  EXTREMITIES:  No edema B/L LE.  NEUROLOGIC:  No motor deficit.  PSYCH: Alert & oriented x 3, normal affect    Consultant(s) Notes Reviewed:  [x ] YES  [ ] NO  Care Discussed with Consultants/Other Providers [ x] YES  [ ] NO - ortho PA    LABS:      No labs available    RADIOLOGY & ADDITIONAL TESTS:  No new images    Medications:  Standing  acetaminophen   Tablet .. 650 milliGRAM(s) Oral every 6 hours  ceFAZolin   IVPB 2000 milliGRAM(s) IV Intermittent every 8 hours  chlorhexidine 4% Liquid 1 Application(s) Topical <User Schedule>  dextrose 5%. 1000 milliLiter(s) IV Continuous <Continuous>  dextrose 50% Injectable 12.5 Gram(s) IV Push once  dextrose 50% Injectable 25 Gram(s) IV Push once  dextrose 50% Injectable 25 Gram(s) IV Push once  insulin lispro (ADMELOG) corrective regimen sliding scale   SubCutaneous three times a day before meals  ketorolac   Injectable 15 milliGRAM(s) IV Push every 6 hours  losartan 25 milliGRAM(s) Oral daily  multivitamin 1 Tablet(s) Oral daily  pantoprazole    Tablet 40 milliGRAM(s) Oral before breakfast  polyethylene glycol 3350 17 Gram(s) Oral daily  sodium chloride 0.9%. 1000 milliLiter(s) IV Continuous <Continuous>    PRN Meds  aluminum hydroxide/magnesium hydroxide/simethicone Suspension 30 milliLiter(s) Oral four times a day PRN  dextrose 40% Gel 15 Gram(s) Oral once PRN  glucagon  Injectable 1 milliGRAM(s) IntraMuscular once PRN  magnesium hydroxide Suspension 30 milliLiter(s) Oral daily PRN  ondansetron Injectable 4 milliGRAM(s) IV Push every 6 hours PRN  senna 2 Tablet(s) Oral at bedtime PRN  traMADol 50 milliGRAM(s) Oral every 4 hours PRN

## 2020-10-21 NOTE — PHYSICAL THERAPY INITIAL EVALUATION ADULT - ACTIVE RANGE OF MOTION EXAMINATION, REHAB EVAL
BLE joints WFL and painfree AAROM/AROM with (L) hip flexion 0-90 degrees and (L) hip abduction o-45 degrees, in supine; Please refer to OT chanel for BUE

## 2020-10-21 NOTE — PHYSICAL THERAPY INITIAL EVALUATION ADULT - GAIT DEVIATIONS NOTED, PT EVAL
decreased step length/stooped posture, dec heel strike/pushoff /stance on LLE/decreased shad/decreased weight-shifting ability

## 2020-10-22 VITALS — SYSTOLIC BLOOD PRESSURE: 99 MMHG | HEART RATE: 97 BPM | TEMPERATURE: 98 F | DIASTOLIC BLOOD PRESSURE: 68 MMHG

## 2020-10-22 LAB
ANION GAP SERPL CALC-SCNC: 7 MMOL/L — SIGNIFICANT CHANGE UP (ref 7–14)
BUN SERPL-MCNC: 19 MG/DL — SIGNIFICANT CHANGE UP (ref 10–20)
CALCIUM SERPL-MCNC: 8.7 MG/DL — SIGNIFICANT CHANGE UP (ref 8.5–10.1)
CHLORIDE SERPL-SCNC: 104 MMOL/L — SIGNIFICANT CHANGE UP (ref 98–110)
CO2 SERPL-SCNC: 25 MMOL/L — SIGNIFICANT CHANGE UP (ref 17–32)
CREAT SERPL-MCNC: 0.8 MG/DL — SIGNIFICANT CHANGE UP (ref 0.7–1.5)
GLUCOSE SERPL-MCNC: 174 MG/DL — HIGH (ref 70–99)
HCT VFR BLD CALC: 35.6 % — LOW (ref 42–52)
HGB BLD-MCNC: 12.4 G/DL — LOW (ref 14–18)
MCHC RBC-ENTMCNC: 32.9 PG — HIGH (ref 27–31)
MCHC RBC-ENTMCNC: 34.8 G/DL — SIGNIFICANT CHANGE UP (ref 32–37)
MCV RBC AUTO: 94.4 FL — HIGH (ref 80–94)
NRBC # BLD: 0 /100 WBCS — SIGNIFICANT CHANGE UP (ref 0–0)
PLATELET # BLD AUTO: 204 K/UL — SIGNIFICANT CHANGE UP (ref 130–400)
POTASSIUM SERPL-MCNC: 4.9 MMOL/L — SIGNIFICANT CHANGE UP (ref 3.5–5)
POTASSIUM SERPL-SCNC: 4.9 MMOL/L — SIGNIFICANT CHANGE UP (ref 3.5–5)
RBC # BLD: 3.77 M/UL — LOW (ref 4.7–6.1)
RBC # FLD: 11.9 % — SIGNIFICANT CHANGE UP (ref 11.5–14.5)
SODIUM SERPL-SCNC: 136 MMOL/L — SIGNIFICANT CHANGE UP (ref 135–146)
WBC # BLD: 14.69 K/UL — HIGH (ref 4.8–10.8)
WBC # FLD AUTO: 14.69 K/UL — HIGH (ref 4.8–10.8)

## 2020-10-22 PROCEDURE — 99232 SBSQ HOSP IP/OBS MODERATE 35: CPT

## 2020-10-22 RX ORDER — PANTOPRAZOLE SODIUM 20 MG/1
1 TABLET, DELAYED RELEASE ORAL
Qty: 30 | Refills: 0
Start: 2020-10-22 | End: 2020-11-20

## 2020-10-22 RX ORDER — SENNA PLUS 8.6 MG/1
2 TABLET ORAL
Qty: 0 | Refills: 0 | DISCHARGE
Start: 2020-10-22

## 2020-10-22 RX ORDER — ACETAMINOPHEN 500 MG
2 TABLET ORAL
Qty: 0 | Refills: 0 | DISCHARGE
Start: 2020-10-22

## 2020-10-22 RX ORDER — TRAMADOL HYDROCHLORIDE 50 MG/1
1 TABLET ORAL
Qty: 42 | Refills: 0
Start: 2020-10-22 | End: 2020-10-28

## 2020-10-22 RX ORDER — CELECOXIB 200 MG/1
1 CAPSULE ORAL
Qty: 28 | Refills: 0
Start: 2020-10-22 | End: 2020-11-04

## 2020-10-22 RX ADMIN — Medication 15 MILLIGRAM(S): at 05:25

## 2020-10-22 RX ADMIN — POLYETHYLENE GLYCOL 3350 17 GRAM(S): 17 POWDER, FOR SOLUTION ORAL at 11:34

## 2020-10-22 RX ADMIN — PANTOPRAZOLE SODIUM 40 MILLIGRAM(S): 20 TABLET, DELAYED RELEASE ORAL at 05:19

## 2020-10-22 RX ADMIN — Medication 650 MILLIGRAM(S): at 11:33

## 2020-10-22 RX ADMIN — CELECOXIB 200 MILLIGRAM(S): 200 CAPSULE ORAL at 05:19

## 2020-10-22 RX ADMIN — METFORMIN HYDROCHLORIDE 500 MILLIGRAM(S): 850 TABLET ORAL at 05:19

## 2020-10-22 RX ADMIN — Medication 15 MILLIGRAM(S): at 00:00

## 2020-10-22 RX ADMIN — Medication 650 MILLIGRAM(S): at 00:00

## 2020-10-22 RX ADMIN — Medication 650 MILLIGRAM(S): at 11:35

## 2020-10-22 RX ADMIN — Medication 650 MILLIGRAM(S): at 05:25

## 2020-10-22 RX ADMIN — RIVAROXABAN 10 MILLIGRAM(S): KIT at 05:26

## 2020-10-22 RX ADMIN — LOSARTAN POTASSIUM 25 MILLIGRAM(S): 100 TABLET, FILM COATED ORAL at 05:21

## 2020-10-22 RX ADMIN — CELECOXIB 200 MILLIGRAM(S): 200 CAPSULE ORAL at 05:25

## 2020-10-22 RX ADMIN — Medication 650 MILLIGRAM(S): at 05:20

## 2020-10-22 RX ADMIN — Medication 1 TABLET(S): at 11:33

## 2020-10-22 RX ADMIN — Medication 4 MILLIGRAM(S): at 05:19

## 2020-10-22 RX ADMIN — Medication 15 MILLIGRAM(S): at 05:19

## 2020-10-22 NOTE — OCCUPATIONAL THERAPY INITIAL EVALUATION ADULT - GENERAL OBSERVATIONS, REHAB EVAL
9:10-9:33 chart reviewed, per RN pt ok to be seen by OT received in bedside chair +Aquacel dressing left hip, ariadna teds, IV lock, pt agreeable to OT eval

## 2020-10-22 NOTE — DISCHARGE NOTE PROVIDER - CARE PROVIDER_API CALL
Rinku Boothe  ORTHOPAEDIC SURGERY  [address] [place] [address]  Phone: [phone]  Fax: [phone]  Follow Up Time:

## 2020-10-22 NOTE — DISCHARGE NOTE PROVIDER - NSDCMRMEDTOKEN_GEN_ALL_CORE_FT
acetaminophen 325 mg oral tablet: 2 tab(s) orally every 6 hours for 14 days  celecoxib 200 mg oral capsule: 1 cap(s) orally every 12 hours  losartan:   metFORMIN:   methocarbamol 500 mg oral tablet: 2 tab(s) orally 4 times a day, As Needed -for moderate pain   metoprolol:   pantoprazole 40 mg oral delayed release tablet: 1 tab(s) orally once a day (before a meal)  senna oral tablet: 2 tab(s) orally once a day (at bedtime), As needed, Constipation  traMADol 50 mg oral tablet: 1 tab(s) orally every 4 hours, As needed, Moderate Pain (4 - 6) MDD:6 tablets  Xarelto:

## 2020-10-22 NOTE — DISCHARGE NOTE PROVIDER - NSDCCPCAREPLAN_GEN_ALL_CORE_FT
PRINCIPAL DISCHARGE DIAGNOSIS  Diagnosis: Arthritis of left hip  Assessment and Plan of Treatment:

## 2020-10-22 NOTE — OCCUPATIONAL THERAPY INITIAL EVALUATION ADULT - ADDITIONAL COMMENTS
pt. has low height tub, built in tub seat, comfort height toilet, grab bars in tub, sock aid,  shoehorn

## 2020-10-22 NOTE — PROGRESS NOTE ADULT - ASSESSMENT
54 yo M presents for arthroplasty    Afib  - CHADsVasc 2, low risk for stroke  - pt takes Xarelto at home  - he is unsure of his dose at home. typical afib dose is 20 mg once daily  - pt currently getting 10 mg BID, discussed with ortho PA-clarify home dose with pt's pharmacy and resume   - c/w metoprolol    BP low normal  - hold next dose losartan    DM II  - uncontrolled, likey due to steroids  - can do basal bolus while in house  - takes metformin at home-states he only takes it once a day not twice  - FS goal 110-180      **recommend against using celecobix with xarelto due to increased bleeding risk**    DVTpx  Full Code  From home

## 2020-10-22 NOTE — DISCHARGE NOTE PROVIDER - HOSPITAL COURSE
55 year old male with pmh of afib, dm and htn admitted for an elective Total Hip Arthroplasty. Routine post op course.

## 2020-10-22 NOTE — DISCHARGE NOTE PROVIDER - NSDCFUADDINST_GEN_ALL_CORE_FT
Keep surgical site clean and dry, may remove dressing in 7  days . Call  Dr. Garcia if any wound drainage, redness , increasing pain, fevers over 101 or if you have any questions or concerns.     You may shower with the bandage on and once it is removed. Once it is removed  , do not scrub surgical site. Do not apply any lotions/moisturizers/creams to surgical site.    Call to make your  post op appointment if you do not have one already.

## 2020-10-22 NOTE — DISCHARGE NOTE NURSING/CASE MANAGEMENT/SOCIAL WORK - PATIENT PORTAL LINK FT
You can access the FollowMyHealth Patient Portal offered by [facility] by registering at the following website: [email or web address]. By joining [employer]’s FollowMyHealth portal, you will also be able to view your health information using other applications (apps) compatible with our system.

## 2020-10-22 NOTE — PROGRESS NOTE ADULT - SUBJECTIVE AND OBJECTIVE BOX
ORTHO PROGRESS NOTE       55y Male POD #  1    S/P left Total Hip Arthroplasty     Patient seen and examined at bedside . The patient is awake and alert in NAD. No complaints of chest pain, SOB, N/V.    PAST MEDICAL & SURGICAL HISTORY:  HTN (hypertension)    Atrial fibrillation    Diabetes    H/O prior ablation treatment    H/O hernia repair          MEDICATIONS  (STANDING):  acetaminophen   Tablet .. 650 milliGRAM(s) Oral every 6 hours  celecoxib 200 milliGRAM(s) Oral every 12 hours  chlorhexidine 4% Liquid 1 Application(s) Topical <User Schedule>  dextrose 5%. 1000 milliLiter(s) (50 mL/Hr) IV Continuous <Continuous>  dextrose 50% Injectable 12.5 Gram(s) IV Push once  dextrose 50% Injectable 25 Gram(s) IV Push once  dextrose 50% Injectable 25 Gram(s) IV Push once  insulin lispro (ADMELOG) corrective regimen sliding scale   SubCutaneous three times a day before meals  losartan 25 milliGRAM(s) Oral daily  metFORMIN 500 milliGRAM(s) Oral two times a day  multivitamin 1 Tablet(s) Oral daily  pantoprazole    Tablet 40 milliGRAM(s) Oral before breakfast  polyethylene glycol 3350 17 Gram(s) Oral daily  rivaroxaban 10 milliGRAM(s) Oral two times a day    MEDICATIONS  (PRN):  aluminum hydroxide/magnesium hydroxide/simethicone Suspension 30 milliLiter(s) Oral four times a day PRN Indigestion  dextrose 40% Gel 15 Gram(s) Oral once PRN Blood Glucose LESS THAN 70 milliGRAM(s)/deciliter  glucagon  Injectable 1 milliGRAM(s) IntraMuscular once PRN Glucose LESS THAN 70 milligrams/deciliter  magnesium hydroxide Suspension 30 milliLiter(s) Oral daily PRN Constipation  ondansetron Injectable 4 milliGRAM(s) IV Push every 6 hours PRN Nausea and/or Vomiting  senna 2 Tablet(s) Oral at bedtime PRN Constipation  traMADol 50 milliGRAM(s) Oral every 4 hours PRN Moderate Pain (4 - 6)        Vital Signs Last 24 Hrs  T(C): 36.1 (22 Oct 2020 05:30), Max: 37 (21 Oct 2020 18:00)  T(F): 96.9 (22 Oct 2020 05:30), Max: 98.6 (21 Oct 2020 18:00)  HR: 84 (22 Oct 2020 05:30) (80 - 126)  BP: 110/69 (22 Oct 2020 05:30) (101/58 - 121/76)  BP(mean): --  RR: 16 (22 Oct 2020 05:30) (10 - 20)  SpO2: 100% (21 Oct 2020 12:00) (99% - 100%)                          12.4   14.69 )-----------( 204      ( 22 Oct 2020 06:59 )             35.6     10-22    136  |  104  |  19  ----------------------------<  174<H>  4.9   |  25  |  0.8    Ca    8.7      22 Oct 2020 06:59            DVT ppx : xarelto        PE:  The patient was seen and examined at bedside          A&OX3, NAD          dressing C/D/I          Compartments soft         NVI, SILT           A/P:     1.     POD #  1     s/p     left Total Hip Arthroplasty                   OOB to Chair            Physical Therapy- wbat            Pain control - per pain protocol            Incentive Spirometry            DVT Prophylaxis - xarelto                      2.      GI ppx- continue Protonix    3.      afib on xarelto     4.      diabetes- monitor fs glucose, continue current meds/ ssi    5.      HTN - stable , continue losartan               Dispo: home today

## 2020-10-22 NOTE — PROGRESS NOTE ADULT - SUBJECTIVE AND OBJECTIVE BOX
CHIEF COMPLAINT:    Patient is a 55y old  Male who presents with a chief complaint of Total Hip Arthroplasty    INTERVAL HPI/OVERNIGHT EVENTS:    Patient seen and examined at bedside. No acute overnight events occurred.    ROS: Reports hip pain is controlle.d All other systems are negative.    Vital Signs:    T(F): 98.3 (10-22-20 @ 09:31), Max: 98.6 (10-21-20 @ 18:00)  HR: 97 (10-22-20 @ 09:31) (80 - 111)  BP: 99/68 (10-22-20 @ 09:31) (99/68 - 116/63)  RR: 16 (10-22-20 @ 05:30) (16 - 17)  SpO2: --  I&O's Summary    21 Oct 2020 07:01  -  22 Oct 2020 07:00  --------------------------------------------------------  IN: 480 mL / OUT: 1450 mL / NET: -970 mL      POCT Blood Glucose.: 252 mg/dL (22 Oct 2020 11:15)  POCT Blood Glucose.: 165 mg/dL (22 Oct 2020 07:43)  POCT Blood Glucose.: 238 mg/dL (21 Oct 2020 20:48)      PHYSICAL EXAM:  GENERAL:  NAD  SKIN: No rashes or lesions  HEENT: Atraumatic. Normocephalic. Anicteric  NECK:  No JVD.   PULMONARY: Clear to ausculation bilaterally. No wheezing. No rales  CVS: Normal S1, S2. Regular rate and rhythm. No murmurs.  ABDOMEN/GI: Soft, Nontender, Nondistended; Bowel sounds are present  EXTREMITIES:  No edema B/L LE.  NEUROLOGIC:  No motor deficit.  PSYCH: Alert & oriented x 3, normal affect      LABS:                        12.4   14.69 )-----------( 204      ( 22 Oct 2020 06:59 )             35.6     10-22    136  |  104  |  19  ----------------------------<  174<H>  4.9   |  25  |  0.8    Ca    8.7      22 Oct 2020 06:59                RADIOLOGY & ADDITIONAL TESTS:  No new images    Medications:  Standing  acetaminophen   Tablet .. 650 milliGRAM(s) Oral every 6 hours  celecoxib 200 milliGRAM(s) Oral every 12 hours  chlorhexidine 4% Liquid 1 Application(s) Topical <User Schedule>  dextrose 5%. 1000 milliLiter(s) IV Continuous <Continuous>  dextrose 50% Injectable 12.5 Gram(s) IV Push once  dextrose 50% Injectable 25 Gram(s) IV Push once  dextrose 50% Injectable 25 Gram(s) IV Push once  insulin lispro (ADMELOG) corrective regimen sliding scale   SubCutaneous three times a day before meals  losartan 25 milliGRAM(s) Oral daily  metFORMIN 500 milliGRAM(s) Oral two times a day  multivitamin 1 Tablet(s) Oral daily  pantoprazole    Tablet 40 milliGRAM(s) Oral before breakfast  polyethylene glycol 3350 17 Gram(s) Oral daily  rivaroxaban 10 milliGRAM(s) Oral two times a day    PRN Meds  aluminum hydroxide/magnesium hydroxide/simethicone Suspension 30 milliLiter(s) Oral four times a day PRN  dextrose 40% Gel 15 Gram(s) Oral once PRN  glucagon  Injectable 1 milliGRAM(s) IntraMuscular once PRN  magnesium hydroxide Suspension 30 milliLiter(s) Oral daily PRN  ondansetron Injectable 4 milliGRAM(s) IV Push every 6 hours PRN  senna 2 Tablet(s) Oral at bedtime PRN  traMADol 50 milliGRAM(s) Oral every 4 hours PRN

## 2020-10-26 DIAGNOSIS — I48.91 UNSPECIFIED ATRIAL FIBRILLATION: ICD-10-CM

## 2020-10-26 DIAGNOSIS — M16.12 UNILATERAL PRIMARY OSTEOARTHRITIS, LEFT HIP: ICD-10-CM

## 2020-10-26 DIAGNOSIS — Z79.01 LONG TERM (CURRENT) USE OF ANTICOAGULANTS: ICD-10-CM

## 2020-10-26 DIAGNOSIS — E11.9 TYPE 2 DIABETES MELLITUS WITHOUT COMPLICATIONS: ICD-10-CM

## 2020-10-26 DIAGNOSIS — I10 ESSENTIAL (PRIMARY) HYPERTENSION: ICD-10-CM

## 2020-10-26 DIAGNOSIS — Z79.84 LONG TERM (CURRENT) USE OF ORAL HYPOGLYCEMIC DRUGS: ICD-10-CM

## 2021-01-19 ENCOUNTER — OUTPATIENT (OUTPATIENT)
Dept: OUTPATIENT SERVICES | Facility: HOSPITAL | Age: 56
LOS: 1 days | Discharge: HOME | End: 2021-01-19
Payer: COMMERCIAL

## 2021-01-19 VITALS
HEIGHT: 69 IN | RESPIRATION RATE: 18 BRPM | SYSTOLIC BLOOD PRESSURE: 130 MMHG | DIASTOLIC BLOOD PRESSURE: 88 MMHG | HEART RATE: 90 BPM | WEIGHT: 273.37 LBS | TEMPERATURE: 96 F | OXYGEN SATURATION: 98 %

## 2021-01-19 DIAGNOSIS — Z96.642 PRESENCE OF LEFT ARTIFICIAL HIP JOINT: Chronic | ICD-10-CM

## 2021-01-19 DIAGNOSIS — Z01.818 ENCOUNTER FOR OTHER PREPROCEDURAL EXAMINATION: ICD-10-CM

## 2021-01-19 DIAGNOSIS — Z98.890 OTHER SPECIFIED POSTPROCEDURAL STATES: Chronic | ICD-10-CM

## 2021-01-19 DIAGNOSIS — M16.11 UNILATERAL PRIMARY OSTEOARTHRITIS, RIGHT HIP: ICD-10-CM

## 2021-01-19 LAB
A1C WITH ESTIMATED AVERAGE GLUCOSE RESULT: 6.9 % — HIGH (ref 4–5.6)
ALBUMIN SERPL ELPH-MCNC: 4.7 G/DL — SIGNIFICANT CHANGE UP (ref 3.5–5.2)
ALP SERPL-CCNC: 52 U/L — SIGNIFICANT CHANGE UP (ref 30–115)
ALT FLD-CCNC: 16 U/L — SIGNIFICANT CHANGE UP (ref 0–41)
ANION GAP SERPL CALC-SCNC: 13 MMOL/L — SIGNIFICANT CHANGE UP (ref 7–14)
APTT BLD: 35.2 SEC — SIGNIFICANT CHANGE UP (ref 27–39.2)
AST SERPL-CCNC: 19 U/L — SIGNIFICANT CHANGE UP (ref 0–41)
BASOPHILS # BLD AUTO: 0.03 K/UL — SIGNIFICANT CHANGE UP (ref 0–0.2)
BASOPHILS NFR BLD AUTO: 0.4 % — SIGNIFICANT CHANGE UP (ref 0–1)
BILIRUB SERPL-MCNC: 0.4 MG/DL — SIGNIFICANT CHANGE UP (ref 0.2–1.2)
BUN SERPL-MCNC: 19 MG/DL — SIGNIFICANT CHANGE UP (ref 10–20)
CALCIUM SERPL-MCNC: 9.9 MG/DL — SIGNIFICANT CHANGE UP (ref 8.5–10.1)
CHLORIDE SERPL-SCNC: 102 MMOL/L — SIGNIFICANT CHANGE UP (ref 98–110)
CO2 SERPL-SCNC: 22 MMOL/L — SIGNIFICANT CHANGE UP (ref 17–32)
CREAT SERPL-MCNC: 0.7 MG/DL — SIGNIFICANT CHANGE UP (ref 0.7–1.5)
EOSINOPHIL # BLD AUTO: 0.2 K/UL — SIGNIFICANT CHANGE UP (ref 0–0.7)
EOSINOPHIL NFR BLD AUTO: 2.8 % — SIGNIFICANT CHANGE UP (ref 0–8)
ESTIMATED AVERAGE GLUCOSE: 151 MG/DL — HIGH (ref 68–114)
GLUCOSE SERPL-MCNC: 132 MG/DL — HIGH (ref 70–99)
HCT VFR BLD CALC: 44.2 % — SIGNIFICANT CHANGE UP (ref 42–52)
HGB BLD-MCNC: 15.8 G/DL — SIGNIFICANT CHANGE UP (ref 14–18)
IMM GRANULOCYTES NFR BLD AUTO: 0.3 % — SIGNIFICANT CHANGE UP (ref 0.1–0.3)
INR BLD: 1.17 RATIO — SIGNIFICANT CHANGE UP (ref 0.65–1.3)
LYMPHOCYTES # BLD AUTO: 1.8 K/UL — SIGNIFICANT CHANGE UP (ref 1.2–3.4)
LYMPHOCYTES # BLD AUTO: 24.9 % — SIGNIFICANT CHANGE UP (ref 20.5–51.1)
MCHC RBC-ENTMCNC: 33.1 PG — HIGH (ref 27–31)
MCHC RBC-ENTMCNC: 35.7 G/DL — SIGNIFICANT CHANGE UP (ref 32–37)
MCV RBC AUTO: 92.5 FL — SIGNIFICANT CHANGE UP (ref 80–94)
MONOCYTES # BLD AUTO: 0.67 K/UL — HIGH (ref 0.1–0.6)
MONOCYTES NFR BLD AUTO: 9.3 % — SIGNIFICANT CHANGE UP (ref 1.7–9.3)
MRSA PCR RESULT.: NEGATIVE — SIGNIFICANT CHANGE UP
NEUTROPHILS # BLD AUTO: 4.5 K/UL — SIGNIFICANT CHANGE UP (ref 1.4–6.5)
NEUTROPHILS NFR BLD AUTO: 62.3 % — SIGNIFICANT CHANGE UP (ref 42.2–75.2)
NRBC # BLD: 0 /100 WBCS — SIGNIFICANT CHANGE UP (ref 0–0)
PLATELET # BLD AUTO: 223 K/UL — SIGNIFICANT CHANGE UP (ref 130–400)
POTASSIUM SERPL-MCNC: 4.5 MMOL/L — SIGNIFICANT CHANGE UP (ref 3.5–5)
POTASSIUM SERPL-SCNC: 4.5 MMOL/L — SIGNIFICANT CHANGE UP (ref 3.5–5)
PROT SERPL-MCNC: 7.7 G/DL — SIGNIFICANT CHANGE UP (ref 6–8)
PROTHROM AB SERPL-ACNC: 13.5 SEC — HIGH (ref 9.95–12.87)
RBC # BLD: 4.78 M/UL — SIGNIFICANT CHANGE UP (ref 4.7–6.1)
RBC # FLD: 11.9 % — SIGNIFICANT CHANGE UP (ref 11.5–14.5)
SODIUM SERPL-SCNC: 137 MMOL/L — SIGNIFICANT CHANGE UP (ref 135–146)
WBC # BLD: 7.22 K/UL — SIGNIFICANT CHANGE UP (ref 4.8–10.8)
WBC # FLD AUTO: 7.22 K/UL — SIGNIFICANT CHANGE UP (ref 4.8–10.8)

## 2021-01-19 PROCEDURE — 93010 ELECTROCARDIOGRAM REPORT: CPT

## 2021-01-19 PROCEDURE — 73502 X-RAY EXAM HIP UNI 2-3 VIEWS: CPT | Mod: 26,RT

## 2021-01-19 RX ORDER — LOSARTAN POTASSIUM 100 MG/1
0 TABLET, FILM COATED ORAL
Qty: 0 | Refills: 0 | DISCHARGE

## 2021-01-19 RX ORDER — METFORMIN HYDROCHLORIDE 850 MG/1
0 TABLET ORAL
Qty: 0 | Refills: 0 | DISCHARGE

## 2021-01-19 RX ORDER — METOPROLOL TARTRATE 50 MG
0 TABLET ORAL
Qty: 0 | Refills: 0 | DISCHARGE

## 2021-01-19 RX ORDER — RIVAROXABAN 15 MG-20MG
0 KIT ORAL
Qty: 0 | Refills: 0 | DISCHARGE

## 2021-01-19 NOTE — H&P PST ADULT - ADDITIONAL PE
Yes steve, does not use cpap machine  no loose teeth from neck  No neck issues noted upon exam.   Class IV airway, uses cane for ambulation

## 2021-01-19 NOTE — H&P PST ADULT - NSICDXPASTSURGICALHX_GEN_ALL_CORE_FT
PAST SURGICAL HISTORY:  H/O hernia repair     H/O prior ablation treatment     S/P hip replacement, left 2020

## 2021-01-19 NOTE — H&P PST ADULT - REASON FOR ADMISSION
54 y/o male presents to PAST for right total hip replacement with Dr. Steven in [facility] under regional on 2/3/21

## 2021-01-19 NOTE — H&P PST ADULT - TEACHING/LEARNING DEVELOPMENTAL CONSIDERATIONS
Dr. Emiliano Tim  
Left message for pt to call back, we need to know why he want to see Urology  
Patient aware referral placed.   
Patient states Dr. Bailey is movig his location so he needs some closer to home  DX: [place], ED  
Spouse is calling to request a referral from Suhail Busby DO.      Referral Requested: Urologist        Spouse states that it is okay to leave a detailed message.     Contact Phone Number: [phone]    Patient was advised that the clinic would return their phone call with 24-48 hours.      
none

## 2021-01-19 NOTE — H&P PST ADULT - HISTORY OF PRESENT ILLNESS
54 y/o male presents to PAST for right total hip replacement with Dr. Steven in [facility] under regional on 2/3/21    Pt states that has had hip pain for the past 3 years. He had left hip replacement in 10/2020. PT states that right hip is bone on bone currently and pain is 9/10 when ambulating Pt is now ready for above procedure.    PATIENT CURRENTLY DENIES CHEST PAIN  SHORTNESS OF BREATH  PALPITATIONS,  DYSURIA, OR UPPER RESPIRATORY INFECTION IN PAST 2 WEEKS  EXERCISE  TOLERANCE  1 FLIGHT OF STAIRS  WITHOUT SHORTNESS OF BREATH, pt gets short of breath with 2 flights of steps.     denies travel outside the [place] in the past 30 days    pt denies any covid s/s, or tested positive in the past  pt advised self quarantine till day of procedure    Anesthesia Alert  NO--Difficult Airway  NO--History of neck surgery or radiation  NO--Limited ROM of neck  NO--History of Malignant hyperthermia  NO--No personal or family history of Pseudocholinesterase deficiency.  NO--Prior Anesthesia Complication  NO--Latex Allergy  NO--Loose teeth  NO--History of Rheumatoid Arthritis  Yes--ASHLI, does not use cpap  NO--Other_____    ^M16.11, 72230                                        HTN (hypertension)    Atrial fibrillation    Diabetes    H/O prior ablation treatment    H/O hernia repair

## 2021-01-31 ENCOUNTER — OUTPATIENT (OUTPATIENT)
Dept: OUTPATIENT SERVICES | Facility: HOSPITAL | Age: 56
LOS: 1 days | Discharge: HOME | End: 2021-01-31

## 2021-01-31 DIAGNOSIS — Z98.890 OTHER SPECIFIED POSTPROCEDURAL STATES: Chronic | ICD-10-CM

## 2021-01-31 DIAGNOSIS — Z11.59 ENCOUNTER FOR SCREENING FOR OTHER VIRAL DISEASES: ICD-10-CM

## 2021-01-31 DIAGNOSIS — Z96.642 PRESENCE OF LEFT ARTIFICIAL HIP JOINT: Chronic | ICD-10-CM

## 2021-02-02 ENCOUNTER — FORM ENCOUNTER (OUTPATIENT)
Age: 56
End: 2021-02-02

## 2021-02-03 ENCOUNTER — INPATIENT (INPATIENT)
Facility: HOSPITAL | Age: 56
LOS: 0 days | Discharge: HOME | End: 2021-02-04
Attending: ORTHOPAEDIC SURGERY | Admitting: ORTHOPAEDIC SURGERY
Payer: COMMERCIAL

## 2021-02-03 VITALS
HEIGHT: 69 IN | OXYGEN SATURATION: 97 % | SYSTOLIC BLOOD PRESSURE: 137 MMHG | DIASTOLIC BLOOD PRESSURE: 90 MMHG | WEIGHT: 265 LBS | TEMPERATURE: 97 F | HEART RATE: 89 BPM | RESPIRATION RATE: 17 BRPM

## 2021-02-03 DIAGNOSIS — M16.9 OSTEOARTHRITIS OF HIP, UNSPECIFIED: ICD-10-CM

## 2021-02-03 DIAGNOSIS — Z96.642 PRESENCE OF LEFT ARTIFICIAL HIP JOINT: Chronic | ICD-10-CM

## 2021-02-03 DIAGNOSIS — Z98.890 OTHER SPECIFIED POSTPROCEDURAL STATES: Chronic | ICD-10-CM

## 2021-02-03 PROCEDURE — 99233 SBSQ HOSP IP/OBS HIGH 50: CPT

## 2021-02-03 PROCEDURE — 88304 TISSUE EXAM BY PATHOLOGIST: CPT | Mod: 26

## 2021-02-03 PROCEDURE — 88311 DECALCIFY TISSUE: CPT | Mod: 26

## 2021-02-03 RX ORDER — SODIUM CHLORIDE 9 MG/ML
1000 INJECTION INTRAMUSCULAR; INTRAVENOUS; SUBCUTANEOUS
Refills: 0 | Status: DISCONTINUED | OUTPATIENT
Start: 2021-02-03 | End: 2021-02-04

## 2021-02-03 RX ORDER — KETOROLAC TROMETHAMINE 30 MG/ML
15 SYRINGE (ML) INJECTION EVERY 6 HOURS
Refills: 0 | Status: DISCONTINUED | OUTPATIENT
Start: 2021-02-03 | End: 2021-02-04

## 2021-02-03 RX ORDER — LOSARTAN POTASSIUM 100 MG/1
100 TABLET, FILM COATED ORAL DAILY
Refills: 0 | Status: DISCONTINUED | OUTPATIENT
Start: 2021-02-03 | End: 2021-02-04

## 2021-02-03 RX ORDER — CHLORHEXIDINE GLUCONATE 213 G/1000ML
1 SOLUTION TOPICAL
Refills: 0 | Status: DISCONTINUED | OUTPATIENT
Start: 2021-02-03 | End: 2021-02-04

## 2021-02-03 RX ORDER — HYDROMORPHONE HYDROCHLORIDE 2 MG/ML
0.5 INJECTION INTRAMUSCULAR; INTRAVENOUS; SUBCUTANEOUS
Refills: 0 | Status: DISCONTINUED | OUTPATIENT
Start: 2021-02-03 | End: 2021-02-03

## 2021-02-03 RX ORDER — GLUCAGON INJECTION, SOLUTION 0.5 MG/.1ML
1 INJECTION, SOLUTION SUBCUTANEOUS ONCE
Refills: 0 | Status: DISCONTINUED | OUTPATIENT
Start: 2021-02-03 | End: 2021-02-04

## 2021-02-03 RX ORDER — RIVAROXABAN 15 MG-20MG
20 KIT ORAL DAILY
Refills: 0 | Status: DISCONTINUED | OUTPATIENT
Start: 2021-02-05 | End: 2021-02-04

## 2021-02-03 RX ORDER — DEXTROSE 50 % IN WATER 50 %
25 SYRINGE (ML) INTRAVENOUS ONCE
Refills: 0 | Status: DISCONTINUED | OUTPATIENT
Start: 2021-02-03 | End: 2021-02-04

## 2021-02-03 RX ORDER — RIVAROXABAN 15 MG-20MG
10 KIT ORAL ONCE
Refills: 0 | Status: COMPLETED | OUTPATIENT
Start: 2021-02-04 | End: 2021-02-03

## 2021-02-03 RX ORDER — PANTOPRAZOLE SODIUM 20 MG/1
40 TABLET, DELAYED RELEASE ORAL
Refills: 0 | Status: DISCONTINUED | OUTPATIENT
Start: 2021-02-03 | End: 2021-02-04

## 2021-02-03 RX ORDER — LOSARTAN POTASSIUM 100 MG/1
1 TABLET, FILM COATED ORAL
Qty: 0 | Refills: 0 | DISCHARGE

## 2021-02-03 RX ORDER — METOPROLOL TARTRATE 50 MG
100 TABLET ORAL DAILY
Refills: 0 | Status: DISCONTINUED | OUTPATIENT
Start: 2021-02-03 | End: 2021-02-03

## 2021-02-03 RX ORDER — CEFAZOLIN SODIUM 1 G
2000 VIAL (EA) INJECTION EVERY 8 HOURS
Refills: 0 | Status: COMPLETED | OUTPATIENT
Start: 2021-02-03 | End: 2021-02-03

## 2021-02-03 RX ORDER — CELECOXIB 200 MG/1
200 CAPSULE ORAL EVERY 12 HOURS
Refills: 0 | Status: DISCONTINUED | OUTPATIENT
Start: 2021-02-04 | End: 2021-02-04

## 2021-02-03 RX ORDER — DEXTROSE 50 % IN WATER 50 %
15 SYRINGE (ML) INTRAVENOUS ONCE
Refills: 0 | Status: DISCONTINUED | OUTPATIENT
Start: 2021-02-03 | End: 2021-02-04

## 2021-02-03 RX ORDER — ACETAMINOPHEN 500 MG
650 TABLET ORAL EVERY 6 HOURS
Refills: 0 | Status: DISCONTINUED | OUTPATIENT
Start: 2021-02-03 | End: 2021-02-04

## 2021-02-03 RX ORDER — ONDANSETRON 8 MG/1
4 TABLET, FILM COATED ORAL ONCE
Refills: 0 | Status: DISCONTINUED | OUTPATIENT
Start: 2021-02-03 | End: 2021-02-03

## 2021-02-03 RX ORDER — SODIUM CHLORIDE 9 MG/ML
1000 INJECTION, SOLUTION INTRAVENOUS
Refills: 0 | Status: DISCONTINUED | OUTPATIENT
Start: 2021-02-03 | End: 2021-02-04

## 2021-02-03 RX ORDER — RIVAROXABAN 15 MG-20MG
1 KIT ORAL
Qty: 0 | Refills: 0 | DISCHARGE

## 2021-02-03 RX ORDER — METOPROLOL TARTRATE 50 MG
100 TABLET ORAL DAILY
Refills: 0 | Status: DISCONTINUED | OUTPATIENT
Start: 2021-02-03 | End: 2021-02-04

## 2021-02-03 RX ORDER — ONDANSETRON 8 MG/1
4 TABLET, FILM COATED ORAL EVERY 6 HOURS
Refills: 0 | Status: DISCONTINUED | OUTPATIENT
Start: 2021-02-03 | End: 2021-02-04

## 2021-02-03 RX ORDER — SENNA PLUS 8.6 MG/1
2 TABLET ORAL AT BEDTIME
Refills: 0 | Status: DISCONTINUED | OUTPATIENT
Start: 2021-02-03 | End: 2021-02-04

## 2021-02-03 RX ORDER — ACETAMINOPHEN 500 MG
1000 TABLET ORAL ONCE
Refills: 0 | Status: COMPLETED | OUTPATIENT
Start: 2021-02-03 | End: 2021-02-03

## 2021-02-03 RX ORDER — CELECOXIB 200 MG/1
400 CAPSULE ORAL ONCE
Refills: 0 | Status: COMPLETED | OUTPATIENT
Start: 2021-02-03 | End: 2021-02-03

## 2021-02-03 RX ORDER — METFORMIN HYDROCHLORIDE 850 MG/1
1 TABLET ORAL
Qty: 0 | Refills: 0 | DISCHARGE

## 2021-02-03 RX ORDER — DEXTROSE 50 % IN WATER 50 %
12.5 SYRINGE (ML) INTRAVENOUS ONCE
Refills: 0 | Status: DISCONTINUED | OUTPATIENT
Start: 2021-02-03 | End: 2021-02-04

## 2021-02-03 RX ORDER — TRAMADOL HYDROCHLORIDE 50 MG/1
50 TABLET ORAL EVERY 4 HOURS
Refills: 0 | Status: DISCONTINUED | OUTPATIENT
Start: 2021-02-03 | End: 2021-02-04

## 2021-02-03 RX ORDER — INSULIN LISPRO 100/ML
VIAL (ML) SUBCUTANEOUS
Refills: 0 | Status: DISCONTINUED | OUTPATIENT
Start: 2021-02-03 | End: 2021-02-04

## 2021-02-03 RX ORDER — SODIUM CHLORIDE 9 MG/ML
1000 INJECTION, SOLUTION INTRAVENOUS
Refills: 0 | Status: DISCONTINUED | OUTPATIENT
Start: 2021-02-03 | End: 2021-02-03

## 2021-02-03 RX ADMIN — SODIUM CHLORIDE 100 MILLILITER(S): 9 INJECTION INTRAMUSCULAR; INTRAVENOUS; SUBCUTANEOUS at 15:20

## 2021-02-03 RX ADMIN — Medication 100 MILLIGRAM(S): at 21:34

## 2021-02-03 RX ADMIN — Medication 650 MILLIGRAM(S): at 11:29

## 2021-02-03 RX ADMIN — Medication 15 MILLIGRAM(S): at 11:30

## 2021-02-03 RX ADMIN — Medication 1 TABLET(S): at 11:29

## 2021-02-03 RX ADMIN — RIVAROXABAN 10 MILLIGRAM(S): KIT at 23:17

## 2021-02-03 RX ADMIN — Medication 2: at 10:48

## 2021-02-03 RX ADMIN — SODIUM CHLORIDE 75 MILLILITER(S): 9 INJECTION, SOLUTION INTRAVENOUS at 11:37

## 2021-02-03 RX ADMIN — Medication 1000 MILLIGRAM(S): at 06:33

## 2021-02-03 RX ADMIN — Medication 650 MILLIGRAM(S): at 16:13

## 2021-02-03 RX ADMIN — Medication 15 MILLIGRAM(S): at 23:17

## 2021-02-03 RX ADMIN — TRAMADOL HYDROCHLORIDE 50 MILLIGRAM(S): 50 TABLET ORAL at 19:16

## 2021-02-03 RX ADMIN — Medication 15 MILLIGRAM(S): at 17:23

## 2021-02-03 RX ADMIN — Medication 100 MILLIGRAM(S): at 15:19

## 2021-02-03 RX ADMIN — Medication 650 MILLIGRAM(S): at 23:17

## 2021-02-03 RX ADMIN — Medication 100 MILLIGRAM(S): at 21:23

## 2021-02-03 RX ADMIN — CELECOXIB 400 MILLIGRAM(S): 200 CAPSULE ORAL at 06:33

## 2021-02-03 RX ADMIN — SENNA PLUS 2 TABLET(S): 8.6 TABLET ORAL at 21:22

## 2021-02-03 NOTE — CONSULT NOTE ADULT - ASSESSMENT
55M PMHx AFib on xarelto, HTN, DM2 here for elective R MARK.    #S/p R MARK  pain control  PT  discharge planning  #AFib  xarelto  toprol 100  #HTN  losartan 100  #DM2  recommend d/c metformin while in hospital  start ssi  #DVT ppx  simon Lubin  [id number]

## 2021-02-03 NOTE — BRIEF OPERATIVE NOTE - COMMENTS
Depuy actis stem pinnacle cup DA approach no specific hip precautions wbat asa for  dvt prophylaxis ,  pain cocktail given vanco powder above and below the fascia  total 2 grams

## 2021-02-03 NOTE — PATIENT PROFILE ADULT - NSTRANSFERBELONGINGSDISPO_GEN_A_NUR
"Viral Syndrome  You or your child has Viral Syndrome. It is the most common infection causing \"colds\" and infections in the nose, throat, sinuses, and breathing tubes. Sometimes the infection causes nausea, vomiting, or diarrhea. The germ that causes the infection is a virus. No antibiotic or other medicine will kill it. There are medicines that you can take to make you or your child more comfortable.   HOME CARE INSTRUCTIONS   · Rest in bed until you start to feel better.   · If you have diarrhea or vomiting, eat small amounts of crackers and toast. Soup is helpful.   · Do not give aspirin or medicine that contains aspirin to children.   · Only take over-the-counter or prescription medicines for pain, discomfort, or fever as directed by your caregiver.   SEEK IMMEDIATE MEDICAL CARE IF:   · You or your child has not improved within one week.   · You or your child has pain that is not at least partially relieved by over-the-counter medicine.   · Thick, colored mucus or blood is coughed up.   · Discharge from the nose becomes thick yellow or green.   · Diarrhea or vomiting gets worse.   · There is any major change in your or your child's condition.   · You or your child develops a skin rash, stiff neck, severe headache, or are unable to hold down food or fluid.   · You or your child has an oral temperature above 102° F (38.9° C), not controlled by medicine.   · Your baby is older than 3 months with a rectal temperature of 102° F (38.9° C) or higher.   · Your baby is 3 months old or younger with a rectal temperature of 100.4° F (38° C) or higher.   Document Released: 12/03/2007 Document Revised: 03/11/2013 Document Reviewed: 12/03/2008  Application [employer]® Patient Information ©2013 [employer].  " with patient

## 2021-02-03 NOTE — PROGRESS NOTE ADULT - SUBJECTIVE AND OBJECTIVE BOX
MARK ORTHOPEDIC  POST OP CHECK     T(C): 36.2 (02-03-21 @ 13:19), Max: 37.1 (02-03-21 @ 11:05)  HR: 94 (02-03-21 @ 13:19) (89 - 109)  BP: 118/70 (02-03-21 @ 13:19) (104/58 - 137/90)  RR: 16 (02-03-21 @ 13:19) (16 - 20)  SpO2: 95% (02-03-21 @ 11:20) (95% - 98%)    preop hgb   15.8  HELD METFORMIN WHILE IN THE HOSPITAL AS PER MEDICINE RECOMMENDATIONS   S/P RIGHT MARK   PAIN CONTROLLED  VSS   A&O X3  DRESSING Aquacell is in place   NVI  ANTIBIOTICS INFUSED  DVT PROPHYLAXIS ORDERED  ENCOURAGE INCENTIVE SPIROMETRY  AM LABS  REHAB TO BEGIN IN THE AM  D/C PLANNING

## 2021-02-03 NOTE — CHART NOTE - NSCHARTNOTEFT_GEN_A_CORE
PACU ANESTHESIA ADMISSION NOTE      Procedure: MARK (total hip arthroplasty)      Post op diagnosis:  OA (osteoarthritis) of hip        ____  Intubated  TV:______       Rate: ______      FiO2: ______    __x__  Patent Airway    __x__  Full return of protective reflexes    __x__  Full recovery from anesthesia / back to baseline     Vitals:   T:  98         R:    20              BP:    130/70              Sat:   99                P: 118      Mental Status:  __x__ Awake   _____ Alert   _____ Drowsy   _____ Sedated    Nausea/Vomiting:  __x__ NO  ______Yes,   See Post - Op Orders          Pain Scale (0-10):  _0____    Treatment: ____ None    ___x_ See Post - Op/PCA Orders    Post - Operative Fluids:   ____ Oral   ___x_ See Post - Op Orders    Plan: Discharge:   ____Home       ___x__Floor     _____Critical Care    _____  Other:_________________    Comments:

## 2021-02-03 NOTE — OCCUPATIONAL THERAPY INITIAL EVALUATION ADULT - LIVES WITH, PROFILE
in a private house 1 step to enter no rails. 1 flight of stairs to bedroom/bathroom. +tub w/ showe w/ grab bars and fold down seat. +comfort toilet seats/children/spouse

## 2021-02-03 NOTE — PHYSICAL THERAPY INITIAL EVALUATION ADULT - GENERAL OBSERVATIONS, REHAB EVAL
13:15-13:50 Chart reviewed. Patient available to be seen for physical therapy, denies pain, confirmed with RN. Pt rec'd in bed +R hip Aquacel in place, no drainage noted in NAD.

## 2021-02-03 NOTE — CONSULT NOTE ADULT - SUBJECTIVE AND OBJECTIVE BOX
INTERVAL HPI/OVERNIGHT EVENTS:    SUBJECTIVE: Patient seen and examined at bedside.     55M PMHx AFib on xarelto, HTN, DM2 here for elective R MARK. Has been having worsening R hip pain for past three years, s/p L MARK 10/2020. Pain 9/10 during ambulation, otherwise able to walk two flights of stairs. S/p OR today without complications. Currently without R hip pain, numbness, tingling, paresthesias.          CONSTITUTIONAL: No weakness, fevers or chills  EYES/ENT: No visual changes;  No vertigo or throat pain   NECK: No pain or stiffness  RESPIRATORY: No cough, wheezing, hemoptysis; No shortness of breath  CARDIOVASCULAR: No chest pain or palpitations  GASTROINTESTINAL: No abdominal or epigastric pain. No nausea, vomiting, or hematemesis; No diarrhea or constipation. No melena or hematochezia.  GENITOURINARY: No dysuria, frequency or hematuria  NEUROLOGICAL: No numbness or weakness  SKIN: No itching, rashes    OBJECTIVE:    VITAL SIGNS:  Vital Signs Last 24 Hrs  T(C): 36.2 (03 Feb 2021 13:19), Max: 37.1 (03 Feb 2021 11:05)  T(F): 97.1 (03 Feb 2021 13:19), Max: 98.7 (03 Feb 2021 11:05)  HR: 94 (03 Feb 2021 13:19) (89 - 109)  BP: 118/70 (03 Feb 2021 13:19) (104/58 - 137/90)  BP(mean): --  RR: 16 (03 Feb 2021 13:19) (16 - 20)  SpO2: 95% (03 Feb 2021 11:20) (95% - 98%)      PHYSICAL EXAM:    General: NAD  HEENT: NC/AT; PERRL, clear conjunctiva  Neck: supple  Respiratory: CTA b/l  Cardiovascular: +S1/S2; RRR  Abdomen: soft, NT/ND; +BS x4  Extremities: WWP, 2+ peripheral pulses b/l; no LE edema  Skin: bandaged R hip  Neurological:    MEDICATIONS:  MEDICATIONS  (STANDING):  acetaminophen   Tablet .. 650 milliGRAM(s) Oral every 6 hours  ceFAZolin   IVPB 2000 milliGRAM(s) IV Intermittent every 8 hours  chlorhexidine 4% Liquid 1 Application(s) Topical <User Schedule>  dextrose 40% Gel 15 Gram(s) Oral once  dextrose 5%. 1000 milliLiter(s) (50 mL/Hr) IV Continuous <Continuous>  dextrose 5%. 1000 milliLiter(s) (100 mL/Hr) IV Continuous <Continuous>  dextrose 50% Injectable 25 Gram(s) IV Push once  dextrose 50% Injectable 12.5 Gram(s) IV Push once  dextrose 50% Injectable 25 Gram(s) IV Push once  glucagon  Injectable 1 milliGRAM(s) IntraMuscular once  insulin lispro (ADMELOG) corrective regimen sliding scale   SubCutaneous three times a day before meals  ketorolac   Injectable 15 milliGRAM(s) IV Push every 6 hours  losartan 100 milliGRAM(s) Oral daily  metoprolol succinate  milliGRAM(s) Oral daily  multivitamin 1 Tablet(s) Oral daily  pantoprazole    Tablet 40 milliGRAM(s) Oral before breakfast  senna 2 Tablet(s) Oral at bedtime  sodium chloride 0.9%. 1000 milliLiter(s) (100 mL/Hr) IV Continuous <Continuous>    MEDICATIONS  (PRN):  aluminum hydroxide/magnesium hydroxide/simethicone Suspension 30 milliLiter(s) Oral four times a day PRN Indigestion  ondansetron Injectable 4 milliGRAM(s) IV Push every 6 hours PRN Nausea and/or Vomiting  traMADol 50 milliGRAM(s) Oral every 4 hours PRN Mild Pain (1 - 3)      ALLERGIES:  Allergies    No Known Drug Allergies  seasonal (Unknown)    Intolerances        LABS:              Creatinine Trend: 0.7<--        hs Troponin:              CSF:                      EKG:   MICROBIOLOGY:    IMAGING:      Labs, imaging, EKG personally reviewed    RADIOLOGY & ADDITIONAL TESTS: Reviewed.

## 2021-02-03 NOTE — PHYSICAL THERAPY INITIAL EVALUATION ADULT - ADDITIONAL COMMENTS
Pt lives with family in pvt house, pt has 1 step to enter house without rail, and flight to bedroom with left rail ascending. Pt has been using st cane for ambulation prior to surgery due to the pain in R hip.

## 2021-02-04 VITALS
SYSTOLIC BLOOD PRESSURE: 106 MMHG | DIASTOLIC BLOOD PRESSURE: 64 MMHG | TEMPERATURE: 98 F | RESPIRATION RATE: 18 BRPM | HEART RATE: 96 BPM

## 2021-02-04 LAB
HCT VFR BLD CALC: 35.4 % — LOW (ref 42–52)
HGB BLD-MCNC: 12 G/DL — LOW (ref 14–18)
MCHC RBC-ENTMCNC: 31.8 PG — HIGH (ref 27–31)
MCHC RBC-ENTMCNC: 33.9 G/DL — SIGNIFICANT CHANGE UP (ref 32–37)
MCV RBC AUTO: 93.9 FL — SIGNIFICANT CHANGE UP (ref 80–94)
NRBC # BLD: 0 /100 WBCS — SIGNIFICANT CHANGE UP (ref 0–0)
PLATELET # BLD AUTO: 208 K/UL — SIGNIFICANT CHANGE UP (ref 130–400)
RBC # BLD: 3.77 M/UL — LOW (ref 4.7–6.1)
RBC # FLD: 11.8 % — SIGNIFICANT CHANGE UP (ref 11.5–14.5)
WBC # BLD: 13.95 K/UL — HIGH (ref 4.8–10.8)
WBC # FLD AUTO: 13.95 K/UL — HIGH (ref 4.8–10.8)

## 2021-02-04 PROCEDURE — 99233 SBSQ HOSP IP/OBS HIGH 50: CPT

## 2021-02-04 RX ORDER — OXYCODONE HYDROCHLORIDE 5 MG/1
5 TABLET ORAL EVERY 6 HOURS
Refills: 0 | Status: DISCONTINUED | OUTPATIENT
Start: 2021-02-04 | End: 2021-02-04

## 2021-02-04 RX ORDER — CELECOXIB 200 MG/1
1 CAPSULE ORAL
Qty: 28 | Refills: 0
Start: 2021-02-04 | End: 2021-02-17

## 2021-02-04 RX ORDER — SENNA PLUS 8.6 MG/1
2 TABLET ORAL
Qty: 0 | Refills: 0 | DISCHARGE
Start: 2021-02-04

## 2021-02-04 RX ORDER — ACETAMINOPHEN 500 MG
2 TABLET ORAL
Qty: 0 | Refills: 0 | DISCHARGE
Start: 2021-02-04

## 2021-02-04 RX ORDER — OXYCODONE HYDROCHLORIDE 5 MG/1
1 TABLET ORAL
Qty: 20 | Refills: 0
Start: 2021-02-04 | End: 2021-02-08

## 2021-02-04 RX ORDER — TRAMADOL HYDROCHLORIDE 50 MG/1
1 TABLET ORAL
Qty: 42 | Refills: 0
Start: 2021-02-04 | End: 2021-02-10

## 2021-02-04 RX ORDER — METOPROLOL TARTRATE 50 MG
1 TABLET ORAL
Qty: 0 | Refills: 0 | DISCHARGE

## 2021-02-04 RX ORDER — PANTOPRAZOLE SODIUM 20 MG/1
1 TABLET, DELAYED RELEASE ORAL
Qty: 30 | Refills: 1
Start: 2021-02-04 | End: 2021-04-04

## 2021-02-04 RX ORDER — METOPROLOL TARTRATE 50 MG
1 TABLET ORAL
Qty: 0 | Refills: 0 | DISCHARGE
Start: 2021-02-04

## 2021-02-04 RX ADMIN — TRAMADOL HYDROCHLORIDE 50 MILLIGRAM(S): 50 TABLET ORAL at 07:13

## 2021-02-04 RX ADMIN — Medication 2: at 08:12

## 2021-02-04 RX ADMIN — Medication 650 MILLIGRAM(S): at 05:27

## 2021-02-04 RX ADMIN — Medication 650 MILLIGRAM(S): at 11:03

## 2021-02-04 RX ADMIN — Medication 4: at 12:09

## 2021-02-04 RX ADMIN — CHLORHEXIDINE GLUCONATE 1 APPLICATION(S): 213 SOLUTION TOPICAL at 05:29

## 2021-02-04 RX ADMIN — Medication 15 MILLIGRAM(S): at 05:28

## 2021-02-04 RX ADMIN — PANTOPRAZOLE SODIUM 40 MILLIGRAM(S): 20 TABLET, DELAYED RELEASE ORAL at 05:28

## 2021-02-04 RX ADMIN — OXYCODONE HYDROCHLORIDE 5 MILLIGRAM(S): 5 TABLET ORAL at 10:39

## 2021-02-04 RX ADMIN — CELECOXIB 200 MILLIGRAM(S): 200 CAPSULE ORAL at 05:27

## 2021-02-04 RX ADMIN — Medication 1 TABLET(S): at 11:03

## 2021-02-04 NOTE — DISCHARGE NOTE PROVIDER - NSDCFUADDINST_GEN_ALL_CORE_FT
Keep surgical site clean and dry, may remove dressing in 7  days . Call  Dr. Anglin   if any wound drainage, redness , increasing pain, fevers over 101 or if you have any questions or concerns.     You may shower with the bandage on and once it is removed. Once it is removed  , do not scrub surgical site. Do not apply any lotions/moisturizers/creams to surgical site.    Call to make your  post op appointment if you do not have one already.

## 2021-02-04 NOTE — DISCHARGE NOTE NURSING/CASE MANAGEMENT/SOCIAL WORK - PATIENT PORTAL LINK FT
You can access the FollowMyHealth Patient Portal offered by [facility] by registering at the following website: [email or web address]. By joining [facility]’s FollowMyHealth portal, you will also be able to view your health information using other applications (apps) compatible with our system.

## 2021-02-04 NOTE — PROGRESS NOTE ADULT - ASSESSMENT
55M PMHx AFib on xarelto, HTN, DM2 here for elective R MARK.    #S/p R MARK  pain control  PT  discharge planning  #AFib  xarelto  toprol 100  #HTN  losartan 100  #DM2  resume metformin upon d/c  #DVT ppx  simon Lubin  [id number]

## 2021-02-04 NOTE — DISCHARGE NOTE PROVIDER - CARE PROVIDER_API CALL
Rinku Boothe)  Orthopaedic Surgery  [address] [place] [address]  Phone: [phone]  Fax: [phone]  Follow Up Time:

## 2021-02-04 NOTE — DISCHARGE NOTE PROVIDER - HOSPITAL COURSE
55 year old male with a pmh of hypertension, afib on xarelto and diabetes, admitted for an elective Total Hip Arthroplasty . Routine post op course.

## 2021-02-04 NOTE — PROGRESS NOTE ADULT - SUBJECTIVE AND OBJECTIVE BOX
ORTHO PROGRESS NOTE       55y Male POD # 1     S/P right Total Hip Arthroplasty     Patient seen and examined at bedside . The patient is awake and alert in NAD. No complaints of chest pain, SOB, N/V.    PAST MEDICAL & SURGICAL HISTORY:  HTN (hypertension)    Atrial fibrillation    Diabetes    S/P hip replacement, left  2020    H/O prior ablation treatment    H/O hernia repair          MEDICATIONS  (STANDING):  acetaminophen   Tablet .. 650 milliGRAM(s) Oral every 6 hours  celecoxib 200 milliGRAM(s) Oral every 12 hours  chlorhexidine 4% Liquid 1 Application(s) Topical <User Schedule>  dextrose 40% Gel 15 Gram(s) Oral once  dextrose 5%. 1000 milliLiter(s) (100 mL/Hr) IV Continuous <Continuous>  dextrose 5%. 1000 milliLiter(s) (50 mL/Hr) IV Continuous <Continuous>  dextrose 50% Injectable 25 Gram(s) IV Push once  dextrose 50% Injectable 12.5 Gram(s) IV Push once  dextrose 50% Injectable 25 Gram(s) IV Push once  glucagon  Injectable 1 milliGRAM(s) IntraMuscular once  insulin lispro (ADMELOG) corrective regimen sliding scale   SubCutaneous three times a day before meals  losartan 100 milliGRAM(s) Oral daily  metoprolol succinate  milliGRAM(s) Oral daily  multivitamin 1 Tablet(s) Oral daily  pantoprazole    Tablet 40 milliGRAM(s) Oral before breakfast  senna 2 Tablet(s) Oral at bedtime    MEDICATIONS  (PRN):  aluminum hydroxide/magnesium hydroxide/simethicone Suspension 30 milliLiter(s) Oral four times a day PRN Indigestion  ondansetron Injectable 4 milliGRAM(s) IV Push every 6 hours PRN Nausea and/or Vomiting  traMADol 50 milliGRAM(s) Oral every 4 hours PRN Mild Pain (1 - 3)        Vital Signs Last 24 Hrs  T(C): 35.8 (04 Feb 2021 04:00), Max: 37.1 (03 Feb 2021 11:05)  T(F): 96.5 (04 Feb 2021 04:00), Max: 98.7 (03 Feb 2021 11:05)  HR: 77 (04 Feb 2021 04:00) (77 - 109)  BP: 102/68 (04 Feb 2021 04:00) (102/68 - 133/95)  BP(mean): --  RR: 18 (04 Feb 2021 04:00) (16 - 20)  SpO2: 95% (03 Feb 2021 11:20) (95% - 98%)                  DVT ppx : xarelto        PE:  The patient was seen and examined at bedside          A&OX3, NAD          dressing C/D/I          Compartments soft         NVI, SILT           A/P:     1.     POD # 1      s/p  right Total Hip Arthroplasty                      OOB to Chair            Physical Therapy- wbat            Pain control - per pain protocol            Incentive Spirometry            DVT Prophylaxis -xarelto            f/u am labs            2.      GI ppx- continue Protonix                   Dispo: home with [facility]   ORTHO PROGRESS NOTE       55y Male POD # 1     S/P right Total Hip Arthroplasty     Patient seen and examined at bedside . The patient is awake and alert in NAD. No complaints of chest pain, SOB, N/V.    PAST MEDICAL & SURGICAL HISTORY:  HTN (hypertension)    Atrial fibrillation    Diabetes    S/P hip replacement, left  2020    H/O prior ablation treatment    H/O hernia repair          MEDICATIONS  (STANDING):  acetaminophen   Tablet .. 650 milliGRAM(s) Oral every 6 hours  celecoxib 200 milliGRAM(s) Oral every 12 hours  chlorhexidine 4% Liquid 1 Application(s) Topical <User Schedule>  dextrose 40% Gel 15 Gram(s) Oral once  dextrose 5%. 1000 milliLiter(s) (100 mL/Hr) IV Continuous <Continuous>  dextrose 5%. 1000 milliLiter(s) (50 mL/Hr) IV Continuous <Continuous>  dextrose 50% Injectable 25 Gram(s) IV Push once  dextrose 50% Injectable 12.5 Gram(s) IV Push once  dextrose 50% Injectable 25 Gram(s) IV Push once  glucagon  Injectable 1 milliGRAM(s) IntraMuscular once  insulin lispro (ADMELOG) corrective regimen sliding scale   SubCutaneous three times a day before meals  losartan 100 milliGRAM(s) Oral daily  metoprolol succinate  milliGRAM(s) Oral daily  multivitamin 1 Tablet(s) Oral daily  pantoprazole    Tablet 40 milliGRAM(s) Oral before breakfast  senna 2 Tablet(s) Oral at bedtime    MEDICATIONS  (PRN):  aluminum hydroxide/magnesium hydroxide/simethicone Suspension 30 milliLiter(s) Oral four times a day PRN Indigestion  ondansetron Injectable 4 milliGRAM(s) IV Push every 6 hours PRN Nausea and/or Vomiting  traMADol 50 milliGRAM(s) Oral every 4 hours PRN Mild Pain (1 - 3)        Vital Signs Last 24 Hrs  T(C): 35.8 (04 Feb 2021 04:00), Max: 37.1 (03 Feb 2021 11:05)  T(F): 96.5 (04 Feb 2021 04:00), Max: 98.7 (03 Feb 2021 11:05)  HR: 77 (04 Feb 2021 04:00) (77 - 109)  BP: 102/68 (04 Feb 2021 04:00) (102/68 - 133/95)  BP(mean): --  RR: 18 (04 Feb 2021 04:00) (16 - 20)  SpO2: 95% (03 Feb 2021 11:20) (95% - 98%)                          12.0   13.95 )-----------( 208      ( 04 Feb 2021 07:46 )             35.4                   DVT ppx : xarelto        PE:  The patient was seen and examined at bedside          A&OX3, NAD          dressing C/D/I          Compartments soft         NVI, SILT           A/P:     1.     POD # 1      s/p  right Total Hip Arthroplasty                      OOB to Chair            Physical Therapy- wbat            Pain control - per pain protocol            Incentive Spirometry            DVT Prophylaxis -xarelto            f/u am labs            2.      GI ppx- continue Protonix                   Dispo: home with [facility]   ORTHO PROGRESS NOTE       55y Male POD # 1     S/P right Total Hip Arthroplasty     Patient seen and examined at bedside . The patient is awake and alert in NAD. No complaints of chest pain, SOB, N/V.    PAST MEDICAL & SURGICAL HISTORY:  HTN (hypertension)    Atrial fibrillation    Diabetes    S/P hip replacement, left  2020    H/O prior ablation treatment    H/O hernia repair          MEDICATIONS  (STANDING):  acetaminophen   Tablet .. 650 milliGRAM(s) Oral every 6 hours  celecoxib 200 milliGRAM(s) Oral every 12 hours  chlorhexidine 4% Liquid 1 Application(s) Topical <User Schedule>  dextrose 40% Gel 15 Gram(s) Oral once  dextrose 5%. 1000 milliLiter(s) (100 mL/Hr) IV Continuous <Continuous>  dextrose 5%. 1000 milliLiter(s) (50 mL/Hr) IV Continuous <Continuous>  dextrose 50% Injectable 25 Gram(s) IV Push once  dextrose 50% Injectable 12.5 Gram(s) IV Push once  dextrose 50% Injectable 25 Gram(s) IV Push once  glucagon  Injectable 1 milliGRAM(s) IntraMuscular once  insulin lispro (ADMELOG) corrective regimen sliding scale   SubCutaneous three times a day before meals  losartan 100 milliGRAM(s) Oral daily  metoprolol succinate  milliGRAM(s) Oral daily  multivitamin 1 Tablet(s) Oral daily  pantoprazole    Tablet 40 milliGRAM(s) Oral before breakfast  senna 2 Tablet(s) Oral at bedtime    MEDICATIONS  (PRN):  aluminum hydroxide/magnesium hydroxide/simethicone Suspension 30 milliLiter(s) Oral four times a day PRN Indigestion  ondansetron Injectable 4 milliGRAM(s) IV Push every 6 hours PRN Nausea and/or Vomiting  traMADol 50 milliGRAM(s) Oral every 4 hours PRN Mild Pain (1 - 3)        Vital Signs Last 24 Hrs  T(C): 35.8 (04 Feb 2021 04:00), Max: 37.1 (03 Feb 2021 11:05)  T(F): 96.5 (04 Feb 2021 04:00), Max: 98.7 (03 Feb 2021 11:05)  HR: 77 (04 Feb 2021 04:00) (77 - 109)  BP: 102/68 (04 Feb 2021 04:00) (102/68 - 133/95)  BP(mean): --  RR: 18 (04 Feb 2021 04:00) (16 - 20)  SpO2: 95% (03 Feb 2021 11:20) (95% - 98%)                          12.0   13.95 )-----------( 208      ( 04 Feb 2021 07:46 )             35.4                   DVT ppx : xarelto        PE:  The patient was seen and examined at bedside          A&OX3, NAD          dressing C/D/I          Compartments soft         NVI, SILT           A/P:     1.     POD # 1      s/p  right Total Hip Arthroplasty                      OOB to Chair            Physical Therapy- wbat            Pain control - per pain protocol            Incentive Spirometry            DVT Prophylaxis -xarelto            f/u am labs            2.      GI ppx- continue Protonix    3.      Afib- rate controlled , continue xarelto    4.      HTN- stable, continue home meds    5.     Diabetes- monitor finger stick , resume metformin upon discharge                 Dispo: home with [facility]

## 2021-02-04 NOTE — PROGRESS NOTE ADULT - SUBJECTIVE AND OBJECTIVE BOX
INTERVAL HPI/OVERNIGHT EVENTS:    SUBJECTIVE: Patient seen and examined at bedside.     no cp, sob, abd pain, fever  R hip pain, dull, achy, nonradiating, worsen with movement    OBJECTIVE:    VITAL SIGNS:  Vital Signs Last 24 Hrs  T(C): 36.6 (04 Feb 2021 10:21), Max: 36.6 (03 Feb 2021 17:20)  T(F): 97.9 (04 Feb 2021 10:21), Max: 97.9 (03 Feb 2021 17:20)  HR: 96 (04 Feb 2021 10:21) (77 - 108)  BP: 106/64 (04 Feb 2021 10:21) (102/68 - 125/71)  BP(mean): --  RR: 18 (04 Feb 2021 10:21) (16 - 18)  SpO2: --      PHYSICAL EXAM:    General: NAD  HEENT: NC/AT; PERRL, clear conjunctiva  Neck: supple  Respiratory: CTA b/l  Cardiovascular: +S1/S2; RRR  Abdomen: soft, NT/ND; +BS x4  Extremities: WWP, 2+ peripheral pulses b/l; no LE edema  Skin: bandaged R hip  Neurological:    MEDICATIONS:  MEDICATIONS  (STANDING):  acetaminophen   Tablet .. 650 milliGRAM(s) Oral every 6 hours  celecoxib 200 milliGRAM(s) Oral every 12 hours  chlorhexidine 4% Liquid 1 Application(s) Topical <User Schedule>  dextrose 40% Gel 15 Gram(s) Oral once  dextrose 5%. 1000 milliLiter(s) (50 mL/Hr) IV Continuous <Continuous>  dextrose 5%. 1000 milliLiter(s) (100 mL/Hr) IV Continuous <Continuous>  dextrose 50% Injectable 25 Gram(s) IV Push once  dextrose 50% Injectable 12.5 Gram(s) IV Push once  dextrose 50% Injectable 25 Gram(s) IV Push once  glucagon  Injectable 1 milliGRAM(s) IntraMuscular once  insulin lispro (ADMELOG) corrective regimen sliding scale   SubCutaneous three times a day before meals  losartan 100 milliGRAM(s) Oral daily  metoprolol succinate  milliGRAM(s) Oral daily  multivitamin 1 Tablet(s) Oral daily  pantoprazole    Tablet 40 milliGRAM(s) Oral before breakfast  senna 2 Tablet(s) Oral at bedtime    MEDICATIONS  (PRN):  aluminum hydroxide/magnesium hydroxide/simethicone Suspension 30 milliLiter(s) Oral four times a day PRN Indigestion  ondansetron Injectable 4 milliGRAM(s) IV Push every 6 hours PRN Nausea and/or Vomiting  oxyCODONE    IR 5 milliGRAM(s) Oral every 6 hours PRN breakthrough pain  traMADol 50 milliGRAM(s) Oral every 4 hours PRN Mild Pain (1 - 3)      ALLERGIES:  Allergies    No Known Drug Allergies  seasonal (Unknown)    Intolerances        LABS:                        12.0   13.95 )-----------( 208      ( 04 Feb 2021 07:46 )             35.4     Hemoglobin: 12.0 g/dL (02-04 @ 07:46)    CBC Full  -  ( 04 Feb 2021 07:46 )  WBC Count : 13.95 K/uL  RBC Count : 3.77 M/uL  Hemoglobin : 12.0 g/dL  Hematocrit : 35.4 %  Platelet Count - Automated : 208 K/uL  Mean Cell Volume : 93.9 fL  Mean Cell Hemoglobin : 31.8 pg  Mean Cell Hemoglobin Concentration : 33.9 g/dL  Auto Neutrophil # : x  Auto Lymphocyte # : x  Auto Monocyte # : x  Auto Eosinophil # : x  Auto Basophil # : x  Auto Neutrophil % : x  Auto Lymphocyte % : x  Auto Monocyte % : x  Auto Eosinophil % : x  Auto Basophil % : x          Creatinine Trend: 0.7<--        hs Troponin:              CSF:                      EKG:   MICROBIOLOGY:    IMAGING:      Labs, imaging, EKG personally reviewed    RADIOLOGY & ADDITIONAL TESTS: Reviewed.

## 2021-02-04 NOTE — DISCHARGE NOTE PROVIDER - NSDCMRMEDTOKEN_GEN_ALL_CORE_FT
acetaminophen 325 mg oral tablet: 2 tab(s) orally every 6 hours x 14 days  celecoxib 200 mg oral capsule: 1 cap(s) orally every 12 hours  losartan 100 mg oral tablet: 1 tab(s) orally once a day  metFORMIN 1000 mg oral tablet: 1 tab(s) orally once a day  metoprolol succinate 100 mg oral tablet, extended release: 1 tab(s) orally once a day  pantoprazole 40 mg oral delayed release tablet: 1 tab(s) orally once a day (before a meal)  senna oral tablet: 2 tab(s) orally once a day (at bedtime)  traMADol 50 mg oral tablet: 1 tab(s) orally every 4 hours, As needed, Mild Pain (1 - 3) MDD:6 tablets  Xarelto 20 mg oral tablet: 1 tab(s) orally once a day (in the evening)

## 2021-02-09 DIAGNOSIS — M16.11 UNILATERAL PRIMARY OSTEOARTHRITIS, RIGHT HIP: ICD-10-CM

## 2021-02-09 DIAGNOSIS — E11.9 TYPE 2 DIABETES MELLITUS WITHOUT COMPLICATIONS: ICD-10-CM

## 2021-02-09 DIAGNOSIS — Z87.891 PERSONAL HISTORY OF NICOTINE DEPENDENCE: ICD-10-CM

## 2021-02-09 DIAGNOSIS — Z79.84 LONG TERM (CURRENT) USE OF ORAL HYPOGLYCEMIC DRUGS: ICD-10-CM

## 2021-02-09 DIAGNOSIS — Z79.01 LONG TERM (CURRENT) USE OF ANTICOAGULANTS: ICD-10-CM

## 2021-02-09 DIAGNOSIS — Z96.642 PRESENCE OF LEFT ARTIFICIAL HIP JOINT: ICD-10-CM

## 2021-02-09 DIAGNOSIS — I48.91 UNSPECIFIED ATRIAL FIBRILLATION: ICD-10-CM

## 2021-02-09 DIAGNOSIS — I10 ESSENTIAL (PRIMARY) HYPERTENSION: ICD-10-CM

## 2021-06-02 NOTE — PATIENT PROFILE ADULT - NSASFALLNEEDSASSISTWITH_GEN_A_NUR
2021    Lei Ramírez    Chief Complaint   Patient presents with    Establish Care    Follow-Up from Hospital       HPI  History was obtained from patient. Gab Dean is a 53 y.o. male who presents today with the followin. Constipation, unspecified constipation type    Patient presents to [facility]. Patient's chief complaint today is chronic constipation. Patient states he has been constipated for the last 2 months. REVIEW OF SYMPTOMS    Review of Systems   Constitutional: Negative for chills, fatigue and fever. HENT: Negative for congestion, mouth sores, postnasal drip, rhinorrhea and sinus pain. Eyes: Negative for photophobia, discharge and redness. Respiratory: Negative for cough and shortness of breath. Cardiovascular: Negative for chest pain. Gastrointestinal: Positive for constipation. Negative for abdominal distention, abdominal pain, nausea and rectal pain. Genitourinary: Negative for difficulty urinating, dysuria, hematuria and urgency. Neurological: Negative for headaches. Psychiatric/Behavioral: Negative for sleep disturbance. PAST MEDICAL HISTORY  History reviewed. No pertinent past medical history. FAMILY HISTORY  History reviewed. No pertinent family history.     SOCIAL HISTORY  Social History     Socioeconomic History    Marital status: Single     Spouse name: None    Number of children: None    Years of education: None    Highest education level: None   Occupational History    None   Tobacco Use    Smoking status: Current Every Day Smoker     Packs/day: 0.50     Types: Cigarettes     Start date: 1990    Smokeless tobacco: Never Used   Substance and Sexual Activity    Alcohol use: None    Drug use: None    Sexual activity: None   Other Topics Concern    None   Social History Narrative    None     Social Determinants of Health     Financial Resource Strain: Low Risk     Difficulty of Paying Living Expenses: Not hard at all   Food Insecurity: No Food Insecurity    Worried About Running Out of Food in the Last Year: Never true    Christine of Food in the Last Year: Never true   Transportation Needs:     Lack of Transportation (Medical):  Lack of Transportation (Non-Medical):    Physical Activity:     Days of Exercise per Week:     Minutes of Exercise per Session:    Stress:     Feeling of Stress :    Social Connections:     Frequency of Communication with Friends and Family:     Frequency of Social Gatherings with Friends and Family:     Attends [employer] Services:     Active Member of Clubs or Organizations:     Attends Club or Organization Meetings:     Marital Status:    Intimate Partner Violence:     Fear of Current or Ex-Partner:     Emotionally Abused:     Physically Abused:     Sexually Abused:         SURGICAL HISTORY  History reviewed. No pertinent surgical history. CURRENT MEDICATIONS  Current Outpatient Medications   Medication Sig Dispense Refill    busPIRone (BUSPAR) 10 MG tablet Take 10 mg by mouth 3 times daily as needed      polyethylene glycol (GLYCOLAX) 17 GM/SCOOP powder Take 17 g by mouth daily 510 g 5     No current facility-administered medications for this visit. ALLERGIES  No Known Allergies    PHYSICAL EXAM    /81   Pulse 78   Temp 98 °F (36.7 °C) (Temporal)   Resp 20   Ht 6' (1.829 m)   Wt 207 lb (93.9 kg)   BMI 28.07 kg/m²     Physical Exam  Vitals and nursing note reviewed. Constitutional:       Appearance: Normal appearance. HENT:      Head: Normocephalic and atraumatic. Nose: Nose normal.      Mouth/Throat:      Mouth: Mucous membranes are moist.      Pharynx: Oropharynx is clear. Eyes:      General: No scleral icterus. Extraocular Movements: Extraocular movements intact. Conjunctiva/sclera: Conjunctivae normal.   Cardiovascular:      Rate and Rhythm: Normal rate and regular rhythm. Pulses: Normal pulses. Heart sounds: Normal heart sounds. Pulmonary:      Effort: Pulmonary effort is normal.      Breath sounds: Normal breath sounds. Abdominal:      General: Abdomen is flat. Bowel sounds are normal. There is no distension. Palpations: Abdomen is soft. There is no mass. Tenderness: There is no abdominal tenderness. There is no right CVA tenderness or left CVA tenderness. Musculoskeletal:         General: No swelling. Cervical back: Neck supple. Right lower leg: No edema. Left lower leg: No edema. Lymphadenopathy:      Cervical: No cervical adenopathy. Skin:     General: Skin is warm and dry. Neurological:      General: No focal deficit present. Mental Status: He is alert and oriented to person, place, and time. Motor: No weakness. Gait: Gait normal.   Psychiatric:         Mood and Affect: Mood normal.         ASSESSMENT & PLAN    Homa Mac was seen today for establish care and follow-up from hospital.    Diagnoses and all orders for this visit:    Constipation, unspecified constipation type  -     Francois Tao MD, Gastroenterology, [facility] ([facility])  -     polyethylene glycol (GLYCOLAX) 17 GM/SCOOP powder; Take 17 g by mouth daily    Records have been requested from [facility].  Patient will follow up in 2 weeks and we will review his labs and his CT report at that time. Patient has been prescribed MiraLAX and is recommended to take 2 or 3 capfuls daily every day until his constipation is relieved. Referral has also been placed to gastroenterology since this has become a chronic problem. Return in about 2 weeks (around 6/16/2021) for recheck constipation. Electronically signed by Tayler Rainey January, DO on 6/2/2021 standing/walking

## 2021-12-20 NOTE — ANESTHESIA FOLLOW-UP NOTE - NSRECOMMEND_GEN_ALL_CORE
Anticoagulation Progress Note      Comment:spoke with patient   Has now discontinued eliquis  Will continue 5mg coumadin   iNR on 21    Assessment  Yes No   []  [] Missed doses:   []  [] Extra doses:   []  [] Significant medication changes (RX, OTC, Herbal):   []  [] High-risk maintenance medications:                []  [] Vitamin K / dietary changes (Vitamin K goal: ___ cups/week):   []  [] Bleeding / bruising:   []  [] Falls / injury:   []  [] Acute illness:    []  [] Alcohol intake:   []  [] Procedures / hospitalization / ER visits:   []  [] Other:    Plan     Anticoagulation Summary  As of 2021    INR goal:  2.0-3.0   TTR:  --   INR used for dosin.1 (2021)   Warfarin maintenance plan:  5 mg (5 mg x 1) every day   Weekly warfarin total:  35 mg   Plan last modified:  Tiffani Becerra RN (2021)   Next INR check:     Target end date:      Indications    Persistent atrial fibrillation (CMS/HCC) [I48.19]  Coronary artery disease involving native coronary artery of native heart without angina pectoris [I25.10]             Anticoagulation Episode Summary     INR check location:      Preferred lab:      Send INR reminders to:  Vanessa Shultz MD ~ CARD [facility] CLINICAL SUPPORT POOL    Comments:        Anticoagulation Care Providers     Provider Role Specialty Phone number    Alejandro Alamo MD Referring Internal Medicine - Clinical Cardiac Electrophysiology [phone] None

## 2022-07-23 NOTE — OCCUPATIONAL THERAPY INITIAL EVALUATION ADULT - IMPAIRED TRANSFERS: TOILET, REHAB EVAL
Name and  verified by Linh Dorantes RN          Right knee incision, looks good, no swelling, no redness, no bruising  Linh Dorantes RN  Pt stte had a reaction to betadine cleaning solution before surgery  Linh Dorantes RN     5/10 RLE/pain/decreased ROM

## 2022-12-09 NOTE — OCCUPATIONAL THERAPY INITIAL EVALUATION ADULT - IMPAIRED TRANSFERS: BED/CHAIR, REHAB EVAL
decreased ROM/decreased flexibility Thalidomide Counseling: I discussed with the patient the risks of thalidomide including but not limited to birth defects, anxiety, weakness, chest pain, dizziness, cough and severe allergy.

## 2023-04-20 PROBLEM — Z00.00 ENCOUNTER FOR PREVENTIVE HEALTH EXAMINATION: Status: ACTIVE | Noted: 2023-04-20

## 2023-09-12 NOTE — PHYSICAL THERAPY INITIAL EVALUATION ADULT - SITTING BALANCE: STATIC
MD at bedside.      Johnny [place], [place]  09/11/23 2011
Pt states pain is better since receiving last dose of pain meds. Family at bedside.      Shereen Nvearez, [address]  09/11/23 1603
Report given to [facility] .  Patient in surgery      Beba Estrada, RN  09/11/23 95 Judge Lj Adames RN  09/11/23 [id number]
good minus

## 2024-04-22 NOTE — OCCUPATIONAL THERAPY INITIAL EVALUATION ADULT - TOILETING, PREVIOUS LEVEL OF FUNCTION, OT EVAL
independent [Chest Pain] : chest pain [Negative] : Heme/Lymph [Earache] : no earache [Nasal Discharge] : no nasal discharge [Sore Throat] : sore throat [Palpitations] : no palpitations [Claudication] : no  leg claudication [Orthopena] : no orthopnea

## 2024-10-01 NOTE — PATIENT PROFILE ADULT - TOBACCO USE
No care due was identified.  [facility] Embedded Care Due Messages. Reference number: [id number].   10/01/2024 9:33:31 AM CDT   Former smoker

## 2025-05-28 NOTE — ED ADULT NURSE NOTE - DISCHARGE DATE/TIME
Patient walked into clinic requesting letter stating that he has multiple conditions that he listed off relating to pain/mental health and that he was unable to work for the last 1.5 years, as well as in the future. PCP was consulted, will not be able to write a letter like this, as we do not determine disability in our clinic. Patient would need to go through the formal disability process, such as the functional capacity exam. Patient was made aware of this. Patient was agitated, stated what was he supposed to do then. Writer explained that if he is unhappy with the options presented, he is welcome to schedule an appointment with PCP to further discuss anything further that we are able to do, but that she is also not able to deem someone disabled, so the letter would ultimately not be done in our clinic. Patient stated it was pointless to schedule the appointment because he didn't want to pay for it and to have the same outcome. Patient asked who could write him the letter then. Writer explained the same options that were already presented. Patient then became more frustrated, walked out of the clinic, and stated he was going to [facility] for malpractice. PCP verbally updated.  
06-Jan-2020 17:05